# Patient Record
Sex: MALE | Race: WHITE | Employment: OTHER | URBAN - NONMETROPOLITAN AREA
[De-identification: names, ages, dates, MRNs, and addresses within clinical notes are randomized per-mention and may not be internally consistent; named-entity substitution may affect disease eponyms.]

---

## 2023-02-19 ENCOUNTER — APPOINTMENT (OUTPATIENT)
Dept: GENERAL RADIOLOGY | Age: 73
DRG: 552 | End: 2023-02-19
Payer: OTHER GOVERNMENT

## 2023-02-19 ENCOUNTER — HOSPITAL ENCOUNTER (INPATIENT)
Age: 73
LOS: 2 days | Discharge: HOME | DRG: 552 | End: 2023-02-23
Attending: EMERGENCY MEDICINE | Admitting: STUDENT IN AN ORGANIZED HEALTH CARE EDUCATION/TRAINING PROGRAM
Payer: OTHER GOVERNMENT

## 2023-02-19 DIAGNOSIS — M54.9 INTRACTABLE BACK PAIN: Primary | ICD-10-CM

## 2023-02-19 LAB
ALBUMIN SERPL-MCNC: 4 G/DL (ref 3.5–5.2)
ALP BLD-CCNC: 50 U/L (ref 40–130)
ALT SERPL-CCNC: 23 U/L (ref 5–41)
ANION GAP SERPL CALCULATED.3IONS-SCNC: 15 MMOL/L (ref 7–19)
AST SERPL-CCNC: 17 U/L (ref 5–40)
BASOPHILS ABSOLUTE: 0 K/UL (ref 0–0.2)
BASOPHILS RELATIVE PERCENT: 0.5 % (ref 0–1)
BILIRUB SERPL-MCNC: 0.8 MG/DL (ref 0.2–1.2)
BUN BLDV-MCNC: 40 MG/DL (ref 8–23)
CALCIUM SERPL-MCNC: 9.1 MG/DL (ref 8.8–10.2)
CHLORIDE BLD-SCNC: 100 MMOL/L (ref 98–111)
CO2: 20 MMOL/L (ref 22–29)
CREAT SERPL-MCNC: 2.5 MG/DL (ref 0.5–1.2)
EOSINOPHILS ABSOLUTE: 0.1 K/UL (ref 0–0.6)
EOSINOPHILS RELATIVE PERCENT: 1.2 % (ref 0–5)
GFR SERPL CREATININE-BSD FRML MDRD: 27 ML/MIN/{1.73_M2}
GLUCOSE BLD-MCNC: 185 MG/DL (ref 74–109)
HCT VFR BLD CALC: 35.9 % (ref 42–52)
HEMOGLOBIN: 12.4 G/DL (ref 14–18)
IMMATURE GRANULOCYTES #: 0 K/UL
LYMPHOCYTES ABSOLUTE: 1.8 K/UL (ref 1.1–4.5)
LYMPHOCYTES RELATIVE PERCENT: 21.8 % (ref 20–40)
MCH RBC QN AUTO: 30.3 PG (ref 27–31)
MCHC RBC AUTO-ENTMCNC: 34.5 G/DL (ref 33–37)
MCV RBC AUTO: 87.8 FL (ref 80–94)
MONOCYTES ABSOLUTE: 0.5 K/UL (ref 0–0.9)
MONOCYTES RELATIVE PERCENT: 6.6 % (ref 0–10)
NEUTROPHILS ABSOLUTE: 5.6 K/UL (ref 1.5–7.5)
NEUTROPHILS RELATIVE PERCENT: 69.7 % (ref 50–65)
PDW BLD-RTO: 13.2 % (ref 11.5–14.5)
PLATELET # BLD: 194 K/UL (ref 130–400)
PMV BLD AUTO: 9.3 FL (ref 9.4–12.4)
POTASSIUM SERPL-SCNC: 3.8 MMOL/L (ref 3.5–5)
PRO-BNP: 95 PG/ML (ref 0–900)
RBC # BLD: 4.09 M/UL (ref 4.7–6.1)
SARS-COV-2, NAAT: NOT DETECTED
SODIUM BLD-SCNC: 135 MMOL/L (ref 136–145)
TOTAL PROTEIN: 7.2 G/DL (ref 6.6–8.7)
TROPONIN: <0.01 NG/ML (ref 0–0.03)
WBC # BLD: 8.1 K/UL (ref 4.8–10.8)

## 2023-02-19 PROCEDURE — 71045 X-RAY EXAM CHEST 1 VIEW: CPT

## 2023-02-19 PROCEDURE — 93005 ELECTROCARDIOGRAM TRACING: CPT | Performed by: EMERGENCY MEDICINE

## 2023-02-19 PROCEDURE — 83880 ASSAY OF NATRIURETIC PEPTIDE: CPT

## 2023-02-19 PROCEDURE — 85025 COMPLETE CBC W/AUTO DIFF WBC: CPT

## 2023-02-19 PROCEDURE — 2580000003 HC RX 258: Performed by: EMERGENCY MEDICINE

## 2023-02-19 PROCEDURE — 99285 EMERGENCY DEPT VISIT HI MDM: CPT

## 2023-02-19 PROCEDURE — 84484 ASSAY OF TROPONIN QUANT: CPT

## 2023-02-19 PROCEDURE — 86140 C-REACTIVE PROTEIN: CPT

## 2023-02-19 PROCEDURE — 82550 ASSAY OF CK (CPK): CPT

## 2023-02-19 PROCEDURE — 6360000002 HC RX W HCPCS: Performed by: EMERGENCY MEDICINE

## 2023-02-19 PROCEDURE — 80053 COMPREHEN METABOLIC PANEL: CPT

## 2023-02-19 PROCEDURE — 96376 TX/PRO/DX INJ SAME DRUG ADON: CPT

## 2023-02-19 PROCEDURE — 6360000002 HC RX W HCPCS

## 2023-02-19 PROCEDURE — 96375 TX/PRO/DX INJ NEW DRUG ADDON: CPT

## 2023-02-19 PROCEDURE — 36415 COLL VENOUS BLD VENIPUNCTURE: CPT

## 2023-02-19 RX ORDER — ROPINIROLE 2 MG/1
2 TABLET, FILM COATED ORAL 2 TIMES DAILY
COMMUNITY

## 2023-02-19 RX ORDER — CLOPIDOGREL BISULFATE 75 MG/1
75 TABLET ORAL DAILY
COMMUNITY

## 2023-02-19 RX ORDER — DULOXETIN HYDROCHLORIDE 20 MG/1
20 CAPSULE, DELAYED RELEASE ORAL DAILY
COMMUNITY

## 2023-02-19 RX ORDER — HYDROCODONE BITARTRATE AND ACETAMINOPHEN 7.5; 325 MG/1; MG/1
1 TABLET ORAL EVERY 8 HOURS PRN
COMMUNITY

## 2023-02-19 RX ORDER — ASPIRIN 81 MG/1
81 TABLET ORAL DAILY
COMMUNITY

## 2023-02-19 RX ORDER — CETIRIZINE HYDROCHLORIDE 10 MG/1
10 TABLET ORAL DAILY
COMMUNITY

## 2023-02-19 RX ORDER — MORPHINE SULFATE 4 MG/ML
4 INJECTION, SOLUTION INTRAMUSCULAR; INTRAVENOUS ONCE
Status: COMPLETED | OUTPATIENT
Start: 2023-02-19 | End: 2023-02-19

## 2023-02-19 RX ORDER — AMOXICILLIN 875 MG/1
875 TABLET, COATED ORAL 2 TIMES DAILY
Status: ON HOLD | COMMUNITY
End: 2023-02-23 | Stop reason: HOSPADM

## 2023-02-19 RX ORDER — AMLODIPINE BESYLATE 10 MG/1
10 TABLET ORAL DAILY
COMMUNITY

## 2023-02-19 RX ORDER — CHLORAL HYDRATE 500 MG
CAPSULE ORAL 2 TIMES DAILY
COMMUNITY

## 2023-02-19 RX ORDER — ORPHENADRINE CITRATE 30 MG/ML
60 INJECTION INTRAMUSCULAR; INTRAVENOUS ONCE
Status: COMPLETED | OUTPATIENT
Start: 2023-02-19 | End: 2023-02-20

## 2023-02-19 RX ORDER — INSULIN GLARGINE 100 [IU]/ML
35 INJECTION, SOLUTION SUBCUTANEOUS 2 TIMES DAILY
COMMUNITY

## 2023-02-19 RX ORDER — LOSARTAN POTASSIUM AND HYDROCHLOROTHIAZIDE 25; 100 MG/1; MG/1
1 TABLET ORAL DAILY
COMMUNITY

## 2023-02-19 RX ORDER — EZETIMIBE 10 MG/1
10 TABLET ORAL DAILY
COMMUNITY

## 2023-02-19 RX ORDER — VITAMIN B COMPLEX
1 CAPSULE ORAL DAILY
COMMUNITY

## 2023-02-19 RX ORDER — HYDROMORPHONE HYDROCHLORIDE 1 MG/ML
1 INJECTION, SOLUTION INTRAMUSCULAR; INTRAVENOUS; SUBCUTANEOUS ONCE
Status: COMPLETED | OUTPATIENT
Start: 2023-02-19 | End: 2023-02-20

## 2023-02-19 RX ORDER — OMEPRAZOLE 20 MG/1
20 CAPSULE, DELAYED RELEASE ORAL DAILY
COMMUNITY

## 2023-02-19 RX ORDER — GABAPENTIN 100 MG/1
100 CAPSULE ORAL 3 TIMES DAILY
COMMUNITY

## 2023-02-19 RX ORDER — MORPHINE SULFATE 4 MG/ML
INJECTION, SOLUTION INTRAMUSCULAR; INTRAVENOUS
Status: COMPLETED
Start: 2023-02-19 | End: 2023-02-19

## 2023-02-19 RX ORDER — 0.9 % SODIUM CHLORIDE 0.9 %
1000 INTRAVENOUS SOLUTION INTRAVENOUS ONCE
Status: COMPLETED | OUTPATIENT
Start: 2023-02-19 | End: 2023-02-19

## 2023-02-19 RX ORDER — DOXAZOSIN 8 MG/1
4 TABLET ORAL NIGHTLY
COMMUNITY

## 2023-02-19 RX ORDER — FINASTERIDE 5 MG/1
5 TABLET, FILM COATED ORAL DAILY
COMMUNITY

## 2023-02-19 RX ORDER — GABAPENTIN 300 MG/1
300 CAPSULE ORAL 3 TIMES DAILY
COMMUNITY

## 2023-02-19 RX ADMIN — SODIUM CHLORIDE 1000 ML: 9 INJECTION, SOLUTION INTRAVENOUS at 18:12

## 2023-02-19 RX ADMIN — MORPHINE SULFATE 4 MG: 4 INJECTION, SOLUTION INTRAMUSCULAR; INTRAVENOUS at 22:50

## 2023-02-19 RX ADMIN — MORPHINE SULFATE 4 MG: 4 INJECTION, SOLUTION INTRAMUSCULAR; INTRAVENOUS at 18:12

## 2023-02-19 ASSESSMENT — PAIN DESCRIPTION - ONSET: ONSET: ON-GOING

## 2023-02-19 ASSESSMENT — PAIN SCALES - GENERAL
PAINLEVEL_OUTOF10: 8
PAINLEVEL_OUTOF10: 8
PAINLEVEL_OUTOF10: 9
PAINLEVEL_OUTOF10: 10
PAINLEVEL_OUTOF10: 9

## 2023-02-19 ASSESSMENT — PAIN DESCRIPTION - DESCRIPTORS
DESCRIPTORS: ACHING;NAGGING
DESCRIPTORS: ACHING;SHARP;STABBING
DESCRIPTORS: STABBING
DESCRIPTORS: STABBING

## 2023-02-19 ASSESSMENT — PAIN DESCRIPTION - ORIENTATION
ORIENTATION: LEFT

## 2023-02-19 ASSESSMENT — PAIN DESCRIPTION - FREQUENCY
FREQUENCY: INTERMITTENT
FREQUENCY: INTERMITTENT

## 2023-02-19 ASSESSMENT — PAIN DESCRIPTION - PAIN TYPE: TYPE: ACUTE PAIN

## 2023-02-19 ASSESSMENT — PAIN DESCRIPTION - LOCATION
LOCATION: BACK
LOCATION: FLANK;RIB CAGE
LOCATION: FLANK
LOCATION: RIB CAGE;FLANK

## 2023-02-19 ASSESSMENT — PAIN - FUNCTIONAL ASSESSMENT
PAIN_FUNCTIONAL_ASSESSMENT: PREVENTS OR INTERFERES SOME ACTIVE ACTIVITIES AND ADLS
PAIN_FUNCTIONAL_ASSESSMENT: 0-10

## 2023-02-19 NOTE — ED NOTES
Pt changed in to gown and placed on cardiac monitor at this time.       Charles Dee RN  02/19/23 7869

## 2023-02-20 ENCOUNTER — APPOINTMENT (OUTPATIENT)
Dept: ULTRASOUND IMAGING | Age: 73
DRG: 552 | End: 2023-02-20
Payer: OTHER GOVERNMENT

## 2023-02-20 ENCOUNTER — APPOINTMENT (OUTPATIENT)
Dept: CT IMAGING | Age: 73
DRG: 552 | End: 2023-02-20
Payer: OTHER GOVERNMENT

## 2023-02-20 PROBLEM — R68.89 SUSPECTED SOFT TISSUE INFECTION: Status: ACTIVE | Noted: 2023-02-20

## 2023-02-20 LAB
ABO/RH: NORMAL
ANION GAP SERPL CALCULATED.3IONS-SCNC: 13 MMOL/L (ref 7–19)
ANTIBODY SCREEN: NORMAL
APTT: 31.3 SEC (ref 26–36.2)
BACTERIA: NEGATIVE /HPF
BASOPHILS ABSOLUTE: 0 K/UL (ref 0–0.2)
BASOPHILS RELATIVE PERCENT: 0.4 % (ref 0–1)
BILIRUBIN URINE: NEGATIVE
BLOOD, URINE: NEGATIVE
BUN BLDV-MCNC: 34 MG/DL (ref 8–23)
C-REACTIVE PROTEIN: 2.83 MG/DL (ref 0–0.5)
CALCIUM SERPL-MCNC: 8.8 MG/DL (ref 8.8–10.2)
CHLORIDE BLD-SCNC: 102 MMOL/L (ref 98–111)
CLARITY: CLEAR
CO2: 20 MMOL/L (ref 22–29)
COLOR: YELLOW
CREAT SERPL-MCNC: 2.3 MG/DL (ref 0.5–1.2)
CRYSTALS, UA: ABNORMAL /HPF
D DIMER: 0.55 UG/ML FEU (ref 0–0.48)
EKG P AXIS: 32 DEGREES
EKG P-R INTERVAL: 184 MS
EKG Q-T INTERVAL: 394 MS
EKG QRS DURATION: 98 MS
EKG QTC CALCULATION (BAZETT): 420 MS
EKG T AXIS: 41 DEGREES
EOSINOPHILS ABSOLUTE: 0.2 K/UL (ref 0–0.6)
EOSINOPHILS RELATIVE PERCENT: 1.9 % (ref 0–5)
EPITHELIAL CELLS, UA: 0 /HPF (ref 0–5)
GFR SERPL CREATININE-BSD FRML MDRD: 29 ML/MIN/{1.73_M2}
GLUCOSE BLD-MCNC: 115 MG/DL (ref 70–99)
GLUCOSE BLD-MCNC: 120 MG/DL (ref 70–99)
GLUCOSE BLD-MCNC: 162 MG/DL (ref 74–109)
GLUCOSE BLD-MCNC: 174 MG/DL (ref 70–99)
GLUCOSE BLD-MCNC: 260 MG/DL (ref 70–99)
GLUCOSE URINE: =>1000 MG/DL
HCT VFR BLD CALC: 34.5 % (ref 42–52)
HEMOGLOBIN: 11.7 G/DL (ref 14–18)
HYALINE CASTS: 0 /HPF (ref 0–8)
IMMATURE GRANULOCYTES #: 0 K/UL
INR BLD: 1.09 (ref 0.88–1.18)
KETONES, URINE: NEGATIVE MG/DL
L. PNEUMOPHILA SEROGP 1 UR AG: NORMAL
LACTIC ACID: 1 MMOL/L (ref 0.5–1.9)
LEUKOCYTE ESTERASE, URINE: NEGATIVE
LYMPHOCYTES ABSOLUTE: 2 K/UL (ref 1.1–4.5)
LYMPHOCYTES RELATIVE PERCENT: 25.6 % (ref 20–40)
MCH RBC QN AUTO: 30.1 PG (ref 27–31)
MCHC RBC AUTO-ENTMCNC: 33.9 G/DL (ref 33–37)
MCV RBC AUTO: 88.7 FL (ref 80–94)
MONOCYTES ABSOLUTE: 0.6 K/UL (ref 0–0.9)
MONOCYTES RELATIVE PERCENT: 7.8 % (ref 0–10)
NEUTROPHILS ABSOLUTE: 4.9 K/UL (ref 1.5–7.5)
NEUTROPHILS RELATIVE PERCENT: 63.9 % (ref 50–65)
NITRITE, URINE: NEGATIVE
PDW BLD-RTO: 13.4 % (ref 11.5–14.5)
PERFORMED ON: ABNORMAL
PH UA: 5 (ref 5–8)
PLATELET # BLD: 176 K/UL (ref 130–400)
PMV BLD AUTO: 9.3 FL (ref 9.4–12.4)
POTASSIUM REFLEX MAGNESIUM: 4.2 MMOL/L (ref 3.5–5)
PROTEIN UA: 30 MG/DL
PROTHROMBIN TIME: 14.1 SEC (ref 12–14.6)
RBC # BLD: 3.89 M/UL (ref 4.7–6.1)
RBC UA: 3 /HPF (ref 0–4)
SEDIMENTATION RATE, ERYTHROCYTE: 48 MM/HR (ref 0–15)
SODIUM BLD-SCNC: 135 MMOL/L (ref 136–145)
SPECIFIC GRAVITY UA: 1.02 (ref 1–1.03)
STREP PNEUMONIAE ANTIGEN, URINE: NORMAL
TOTAL CK: 73 U/L (ref 39–308)
UROBILINOGEN, URINE: 0.2 E.U./DL
WBC # BLD: 7.7 K/UL (ref 4.8–10.8)
WBC UA: 1 /HPF (ref 0–5)

## 2023-02-20 PROCEDURE — 86850 RBC ANTIBODY SCREEN: CPT

## 2023-02-20 PROCEDURE — 6370000000 HC RX 637 (ALT 250 FOR IP): Performed by: HOSPITALIST

## 2023-02-20 PROCEDURE — 6360000002 HC RX W HCPCS: Performed by: HOSPITALIST

## 2023-02-20 PROCEDURE — 85610 PROTHROMBIN TIME: CPT

## 2023-02-20 PROCEDURE — 85025 COMPLETE CBC W/AUTO DIFF WBC: CPT

## 2023-02-20 PROCEDURE — 2580000003 HC RX 258: Performed by: HOSPITALIST

## 2023-02-20 PROCEDURE — G0378 HOSPITAL OBSERVATION PER HR: HCPCS

## 2023-02-20 PROCEDURE — 96365 THER/PROPH/DIAG IV INF INIT: CPT

## 2023-02-20 PROCEDURE — 76882 US LMTD JT/FCL EVL NVASC XTR: CPT

## 2023-02-20 PROCEDURE — 36415 COLL VENOUS BLD VENIPUNCTURE: CPT

## 2023-02-20 PROCEDURE — 72131 CT LUMBAR SPINE W/O DYE: CPT

## 2023-02-20 PROCEDURE — 86901 BLOOD TYPING SEROLOGIC RH(D): CPT

## 2023-02-20 PROCEDURE — 87449 NOS EACH ORGANISM AG IA: CPT

## 2023-02-20 PROCEDURE — 99222 1ST HOSP IP/OBS MODERATE 55: CPT | Performed by: SURGERY

## 2023-02-20 PROCEDURE — 74150 CT ABDOMEN W/O CONTRAST: CPT

## 2023-02-20 PROCEDURE — 85652 RBC SED RATE AUTOMATED: CPT

## 2023-02-20 PROCEDURE — 82962 GLUCOSE BLOOD TEST: CPT

## 2023-02-20 PROCEDURE — 6360000002 HC RX W HCPCS: Performed by: EMERGENCY MEDICINE

## 2023-02-20 PROCEDURE — 96367 TX/PROPH/DG ADDL SEQ IV INF: CPT

## 2023-02-20 PROCEDURE — 76882 US LMTD JT/FCL EVL NVASC XTR: CPT | Performed by: RADIOLOGY

## 2023-02-20 PROCEDURE — 2500000003 HC RX 250 WO HCPCS: Performed by: HOSPITALIST

## 2023-02-20 PROCEDURE — 80048 BASIC METABOLIC PNL TOTAL CA: CPT

## 2023-02-20 PROCEDURE — 96375 TX/PRO/DX INJ NEW DRUG ADDON: CPT

## 2023-02-20 PROCEDURE — 81001 URINALYSIS AUTO W/SCOPE: CPT

## 2023-02-20 PROCEDURE — 93010 ELECTROCARDIOGRAM REPORT: CPT | Performed by: INTERNAL MEDICINE

## 2023-02-20 PROCEDURE — 76770 US EXAM ABDO BACK WALL COMP: CPT

## 2023-02-20 PROCEDURE — 87040 BLOOD CULTURE FOR BACTERIA: CPT

## 2023-02-20 PROCEDURE — 96376 TX/PRO/DX INJ SAME DRUG ADON: CPT

## 2023-02-20 PROCEDURE — 71250 CT THORAX DX C-: CPT

## 2023-02-20 PROCEDURE — 83605 ASSAY OF LACTIC ACID: CPT

## 2023-02-20 PROCEDURE — 94760 N-INVAS EAR/PLS OXIMETRY 1: CPT

## 2023-02-20 PROCEDURE — 85730 THROMBOPLASTIN TIME PARTIAL: CPT

## 2023-02-20 PROCEDURE — 86900 BLOOD TYPING SEROLOGIC ABO: CPT

## 2023-02-20 PROCEDURE — 87635 SARS-COV-2 COVID-19 AMP PRB: CPT

## 2023-02-20 PROCEDURE — 96372 THER/PROPH/DIAG INJ SC/IM: CPT

## 2023-02-20 PROCEDURE — 85379 FIBRIN DEGRADATION QUANT: CPT

## 2023-02-20 RX ORDER — CALCIUM CARBONATE 200(500)MG
500 TABLET,CHEWABLE ORAL 3 TIMES DAILY PRN
Status: DISCONTINUED | OUTPATIENT
Start: 2023-02-20 | End: 2023-02-23 | Stop reason: HOSPADM

## 2023-02-20 RX ORDER — CETIRIZINE HYDROCHLORIDE 10 MG/1
10 TABLET ORAL DAILY PRN
Status: DISCONTINUED | OUTPATIENT
Start: 2023-02-20 | End: 2023-02-20

## 2023-02-20 RX ORDER — ROPINIROLE 2 MG/1
2 TABLET, FILM COATED ORAL 2 TIMES DAILY
Status: DISCONTINUED | OUTPATIENT
Start: 2023-02-20 | End: 2023-02-23 | Stop reason: HOSPADM

## 2023-02-20 RX ORDER — ACETAMINOPHEN 650 MG/1
650 SUPPOSITORY RECTAL EVERY 6 HOURS PRN
Status: DISCONTINUED | OUTPATIENT
Start: 2023-02-20 | End: 2023-02-23 | Stop reason: HOSPADM

## 2023-02-20 RX ORDER — MEPERIDINE HYDROCHLORIDE 25 MG/ML
25 INJECTION INTRAMUSCULAR; INTRAVENOUS; SUBCUTANEOUS EVERY 4 HOURS PRN
Status: DISPENSED | OUTPATIENT
Start: 2023-02-20 | End: 2023-02-22

## 2023-02-20 RX ORDER — DEXTROSE MONOHYDRATE 100 MG/ML
INJECTION, SOLUTION INTRAVENOUS CONTINUOUS PRN
Status: DISCONTINUED | OUTPATIENT
Start: 2023-02-20 | End: 2023-02-23 | Stop reason: HOSPADM

## 2023-02-20 RX ORDER — HYDROMORPHONE HYDROCHLORIDE 1 MG/ML
1 INJECTION, SOLUTION INTRAMUSCULAR; INTRAVENOUS; SUBCUTANEOUS ONCE
Status: COMPLETED | OUTPATIENT
Start: 2023-02-20 | End: 2023-02-20

## 2023-02-20 RX ORDER — INSULIN GLARGINE 100 [IU]/ML
35 INJECTION, SOLUTION SUBCUTANEOUS 2 TIMES DAILY
Status: DISCONTINUED | OUTPATIENT
Start: 2023-02-20 | End: 2023-02-23 | Stop reason: HOSPADM

## 2023-02-20 RX ORDER — LOSARTAN POTASSIUM 100 MG/1
100 TABLET ORAL DAILY
Status: DISCONTINUED | OUTPATIENT
Start: 2023-02-20 | End: 2023-02-23 | Stop reason: HOSPADM

## 2023-02-20 RX ORDER — MECOBALAMIN 5000 MCG
5 TABLET,DISINTEGRATING ORAL NIGHTLY PRN
Status: DISCONTINUED | OUTPATIENT
Start: 2023-02-20 | End: 2023-02-23 | Stop reason: HOSPADM

## 2023-02-20 RX ORDER — CLOPIDOGREL BISULFATE 75 MG/1
75 TABLET ORAL DAILY
Status: DISCONTINUED | OUTPATIENT
Start: 2023-02-20 | End: 2023-02-23 | Stop reason: HOSPADM

## 2023-02-20 RX ORDER — ENOXAPARIN SODIUM 100 MG/ML
30 INJECTION SUBCUTANEOUS 2 TIMES DAILY
Status: DISCONTINUED | OUTPATIENT
Start: 2023-02-20 | End: 2023-02-23 | Stop reason: HOSPADM

## 2023-02-20 RX ORDER — HYDROMORPHONE HYDROCHLORIDE 1 MG/ML
2 INJECTION, SOLUTION INTRAMUSCULAR; INTRAVENOUS; SUBCUTANEOUS EVERY 4 HOURS PRN
Status: DISCONTINUED | OUTPATIENT
Start: 2023-02-20 | End: 2023-02-20

## 2023-02-20 RX ORDER — POTASSIUM CHLORIDE 7.45 MG/ML
10 INJECTION INTRAVENOUS PRN
Status: DISCONTINUED | OUTPATIENT
Start: 2023-02-20 | End: 2023-02-23 | Stop reason: HOSPADM

## 2023-02-20 RX ORDER — SODIUM CHLORIDE 0.9 % (FLUSH) 0.9 %
5-40 SYRINGE (ML) INJECTION PRN
Status: DISCONTINUED | OUTPATIENT
Start: 2023-02-20 | End: 2023-02-23 | Stop reason: HOSPADM

## 2023-02-20 RX ORDER — ONDANSETRON 2 MG/ML
4 INJECTION INTRAMUSCULAR; INTRAVENOUS EVERY 6 HOURS PRN
Status: DISCONTINUED | OUTPATIENT
Start: 2023-02-20 | End: 2023-02-23 | Stop reason: HOSPADM

## 2023-02-20 RX ORDER — ORPHENADRINE CITRATE 30 MG/ML
60 INJECTION INTRAMUSCULAR; INTRAVENOUS ONCE
Status: COMPLETED | OUTPATIENT
Start: 2023-02-20 | End: 2023-02-20

## 2023-02-20 RX ORDER — AMLODIPINE BESYLATE 5 MG/1
10 TABLET ORAL DAILY
Status: DISCONTINUED | OUTPATIENT
Start: 2023-02-20 | End: 2023-02-23 | Stop reason: HOSPADM

## 2023-02-20 RX ORDER — MAGNESIUM SULFATE IN WATER 40 MG/ML
2000 INJECTION, SOLUTION INTRAVENOUS PRN
Status: DISCONTINUED | OUTPATIENT
Start: 2023-02-20 | End: 2023-02-23 | Stop reason: HOSPADM

## 2023-02-20 RX ORDER — ORPHENADRINE CITRATE 30 MG/ML
60 INJECTION INTRAMUSCULAR; INTRAVENOUS EVERY 12 HOURS
Status: DISCONTINUED | OUTPATIENT
Start: 2023-02-20 | End: 2023-02-22

## 2023-02-20 RX ORDER — ROSUVASTATIN CALCIUM 10 MG/1
10 TABLET, COATED ORAL DAILY
Status: DISCONTINUED | OUTPATIENT
Start: 2023-02-20 | End: 2023-02-23 | Stop reason: HOSPADM

## 2023-02-20 RX ORDER — GABAPENTIN 300 MG/1
300 CAPSULE ORAL 3 TIMES DAILY
Status: DISCONTINUED | OUTPATIENT
Start: 2023-02-20 | End: 2023-02-22

## 2023-02-20 RX ORDER — SODIUM CHLORIDE 0.9 % (FLUSH) 0.9 %
5-40 SYRINGE (ML) INJECTION EVERY 12 HOURS SCHEDULED
Status: DISCONTINUED | OUTPATIENT
Start: 2023-02-20 | End: 2023-02-23 | Stop reason: HOSPADM

## 2023-02-20 RX ORDER — POLYETHYLENE GLYCOL 3350 17 G/17G
17 POWDER, FOR SOLUTION ORAL DAILY PRN
Status: DISCONTINUED | OUTPATIENT
Start: 2023-02-20 | End: 2023-02-23 | Stop reason: HOSPADM

## 2023-02-20 RX ORDER — ACETAMINOPHEN 325 MG/1
650 TABLET ORAL EVERY 4 HOURS PRN
Status: DISCONTINUED | OUTPATIENT
Start: 2023-02-20 | End: 2023-02-23 | Stop reason: HOSPADM

## 2023-02-20 RX ORDER — POTASSIUM CHLORIDE 20 MEQ/1
40 TABLET, EXTENDED RELEASE ORAL PRN
Status: DISCONTINUED | OUTPATIENT
Start: 2023-02-20 | End: 2023-02-23 | Stop reason: HOSPADM

## 2023-02-20 RX ORDER — LOSARTAN POTASSIUM AND HYDROCHLOROTHIAZIDE 25; 100 MG/1; MG/1
1 TABLET ORAL DAILY
Status: DISCONTINUED | OUTPATIENT
Start: 2023-02-20 | End: 2023-02-20 | Stop reason: CLARIF

## 2023-02-20 RX ORDER — DOXAZOSIN 2 MG/1
4 TABLET ORAL DAILY
Status: DISCONTINUED | OUTPATIENT
Start: 2023-02-20 | End: 2023-02-23 | Stop reason: HOSPADM

## 2023-02-20 RX ORDER — HYDROMORPHONE HYDROCHLORIDE 1 MG/ML
1 INJECTION, SOLUTION INTRAMUSCULAR; INTRAVENOUS; SUBCUTANEOUS EVERY 4 HOURS PRN
Status: DISCONTINUED | OUTPATIENT
Start: 2023-02-20 | End: 2023-02-20

## 2023-02-20 RX ORDER — TIZANIDINE 4 MG/1
4 TABLET ORAL EVERY 6 HOURS PRN
Status: DISCONTINUED | OUTPATIENT
Start: 2023-02-20 | End: 2023-02-22

## 2023-02-20 RX ORDER — ONDANSETRON 4 MG/1
4 TABLET, ORALLY DISINTEGRATING ORAL EVERY 8 HOURS PRN
Status: DISCONTINUED | OUTPATIENT
Start: 2023-02-20 | End: 2023-02-23 | Stop reason: HOSPADM

## 2023-02-20 RX ORDER — NALOXONE HYDROCHLORIDE 0.4 MG/ML
0.4 INJECTION, SOLUTION INTRAMUSCULAR; INTRAVENOUS; SUBCUTANEOUS PRN
Status: DISCONTINUED | OUTPATIENT
Start: 2023-02-20 | End: 2023-02-23 | Stop reason: HOSPADM

## 2023-02-20 RX ORDER — HYDROMORPHONE HYDROCHLORIDE 1 MG/ML
0.5 INJECTION, SOLUTION INTRAMUSCULAR; INTRAVENOUS; SUBCUTANEOUS EVERY 4 HOURS PRN
Status: DISCONTINUED | OUTPATIENT
Start: 2023-02-20 | End: 2023-02-20

## 2023-02-20 RX ORDER — EZETIMIBE 10 MG/1
10 TABLET ORAL DAILY
Status: DISCONTINUED | OUTPATIENT
Start: 2023-02-20 | End: 2023-02-23 | Stop reason: HOSPADM

## 2023-02-20 RX ORDER — VITAMIN B COMPLEX
2000 TABLET ORAL DAILY
Status: DISCONTINUED | OUTPATIENT
Start: 2023-02-20 | End: 2023-02-23 | Stop reason: HOSPADM

## 2023-02-20 RX ORDER — CHLORAL HYDRATE 500 MG
1 CAPSULE ORAL 2 TIMES DAILY
Status: DISCONTINUED | OUTPATIENT
Start: 2023-02-20 | End: 2023-02-20 | Stop reason: CLARIF

## 2023-02-20 RX ORDER — VITAMIN C
1 TAB ORAL DAILY
Status: DISCONTINUED | OUTPATIENT
Start: 2023-02-20 | End: 2023-02-23 | Stop reason: HOSPADM

## 2023-02-20 RX ORDER — FINASTERIDE 5 MG/1
5 TABLET, FILM COATED ORAL DAILY
Status: DISCONTINUED | OUTPATIENT
Start: 2023-02-20 | End: 2023-02-23 | Stop reason: HOSPADM

## 2023-02-20 RX ORDER — INSULIN LISPRO 100 [IU]/ML
0-4 INJECTION, SOLUTION INTRAVENOUS; SUBCUTANEOUS NIGHTLY
Status: DISCONTINUED | OUTPATIENT
Start: 2023-02-20 | End: 2023-02-23 | Stop reason: HOSPADM

## 2023-02-20 RX ORDER — CLINDAMYCIN PHOSPHATE 900 MG/50ML
900 INJECTION INTRAVENOUS EVERY 8 HOURS
Status: DISCONTINUED | OUTPATIENT
Start: 2023-02-20 | End: 2023-02-20

## 2023-02-20 RX ORDER — DULOXETIN HYDROCHLORIDE 20 MG/1
20 CAPSULE, DELAYED RELEASE ORAL DAILY
Status: DISCONTINUED | OUTPATIENT
Start: 2023-02-20 | End: 2023-02-23 | Stop reason: HOSPADM

## 2023-02-20 RX ORDER — HYDROMORPHONE HYDROCHLORIDE 1 MG/ML
2 INJECTION, SOLUTION INTRAMUSCULAR; INTRAVENOUS; SUBCUTANEOUS EVERY 4 HOURS PRN
Status: DISCONTINUED | OUTPATIENT
Start: 2023-02-20 | End: 2023-02-21

## 2023-02-20 RX ORDER — INSULIN LISPRO 100 [IU]/ML
0-8 INJECTION, SOLUTION INTRAVENOUS; SUBCUTANEOUS
Status: DISCONTINUED | OUTPATIENT
Start: 2023-02-20 | End: 2023-02-23 | Stop reason: HOSPADM

## 2023-02-20 RX ORDER — ASPIRIN 81 MG/1
81 TABLET ORAL DAILY
Status: DISCONTINUED | OUTPATIENT
Start: 2023-02-20 | End: 2023-02-23 | Stop reason: HOSPADM

## 2023-02-20 RX ORDER — SODIUM CHLORIDE 9 MG/ML
INJECTION, SOLUTION INTRAVENOUS PRN
Status: DISCONTINUED | OUTPATIENT
Start: 2023-02-20 | End: 2023-02-23 | Stop reason: HOSPADM

## 2023-02-20 RX ORDER — PANTOPRAZOLE SODIUM 20 MG/1
20 TABLET, DELAYED RELEASE ORAL
Status: DISCONTINUED | OUTPATIENT
Start: 2023-02-20 | End: 2023-02-23 | Stop reason: HOSPADM

## 2023-02-20 RX ORDER — HYDROCHLOROTHIAZIDE 25 MG/1
25 TABLET ORAL DAILY
Status: DISCONTINUED | OUTPATIENT
Start: 2023-02-20 | End: 2023-02-23 | Stop reason: HOSPADM

## 2023-02-20 RX ADMIN — CLINDAMYCIN PHOSPHATE 900 MG: 900 INJECTION, SOLUTION INTRAVENOUS at 04:22

## 2023-02-20 RX ADMIN — LOSARTAN POTASSIUM 100 MG: 100 TABLET, FILM COATED ORAL at 11:06

## 2023-02-20 RX ADMIN — FINASTERIDE 5 MG: 5 TABLET, FILM COATED ORAL at 11:06

## 2023-02-20 RX ADMIN — ROPINIROLE HYDROCHLORIDE 2 MG: 2 TABLET, FILM COATED ORAL at 11:06

## 2023-02-20 RX ADMIN — ROPINIROLE HYDROCHLORIDE 2 MG: 2 TABLET, FILM COATED ORAL at 21:13

## 2023-02-20 RX ADMIN — INSULIN GLARGINE 35 UNITS: 100 INJECTION, SOLUTION SUBCUTANEOUS at 21:15

## 2023-02-20 RX ADMIN — SODIUM CHLORIDE, PRESERVATIVE FREE 10 ML: 5 INJECTION INTRAVENOUS at 16:51

## 2023-02-20 RX ADMIN — HYDROCHLOROTHIAZIDE 25 MG: 25 TABLET ORAL at 11:06

## 2023-02-20 RX ADMIN — VANCOMYCIN HYDROCHLORIDE 2500 MG: 10 INJECTION, POWDER, LYOPHILIZED, FOR SOLUTION INTRAVENOUS at 06:15

## 2023-02-20 RX ADMIN — Medication 2000 UNITS: at 11:06

## 2023-02-20 RX ADMIN — GABAPENTIN 300 MG: 300 CAPSULE ORAL at 14:39

## 2023-02-20 RX ADMIN — MEROPENEM 1000 MG: 1 INJECTION, POWDER, FOR SOLUTION INTRAVENOUS at 06:00

## 2023-02-20 RX ADMIN — ORPHENADRINE CITRATE 60 MG: 30 INJECTION INTRAMUSCULAR; INTRAVENOUS at 08:06

## 2023-02-20 RX ADMIN — Medication 1 TABLET: at 11:06

## 2023-02-20 RX ADMIN — ORPHENADRINE CITRATE 60 MG: 30 INJECTION INTRAMUSCULAR; INTRAVENOUS at 00:21

## 2023-02-20 RX ADMIN — ORPHENADRINE CITRATE 60 MG: 30 INJECTION INTRAMUSCULAR; INTRAVENOUS at 17:15

## 2023-02-20 RX ADMIN — MEROPENEM 1000 MG: 1 INJECTION, POWDER, FOR SOLUTION INTRAVENOUS at 17:15

## 2023-02-20 RX ADMIN — HYDROMORPHONE HYDROCHLORIDE 1 MG: 1 INJECTION, SOLUTION INTRAMUSCULAR; INTRAVENOUS; SUBCUTANEOUS at 03:34

## 2023-02-20 RX ADMIN — ENOXAPARIN SODIUM 30 MG: 100 INJECTION SUBCUTANEOUS at 21:13

## 2023-02-20 RX ADMIN — AMLODIPINE BESYLATE 10 MG: 5 TABLET ORAL at 11:06

## 2023-02-20 RX ADMIN — PANTOPRAZOLE SODIUM 20 MG: 20 TABLET, DELAYED RELEASE ORAL at 11:06

## 2023-02-20 RX ADMIN — SODIUM CHLORIDE, PRESERVATIVE FREE 10 ML: 5 INJECTION INTRAVENOUS at 11:07

## 2023-02-20 RX ADMIN — HYDROMORPHONE HYDROCHLORIDE 1 MG: 1 INJECTION, SOLUTION INTRAMUSCULAR; INTRAVENOUS; SUBCUTANEOUS at 00:04

## 2023-02-20 RX ADMIN — DULOXETINE 20 MG: 20 CAPSULE, DELAYED RELEASE ORAL at 11:06

## 2023-02-20 RX ADMIN — MEPERIDINE HYDROCHLORIDE 25 MG: 25 INJECTION, SOLUTION INTRAMUSCULAR; INTRAVENOUS; SUBCUTANEOUS at 11:15

## 2023-02-20 RX ADMIN — HYDROMORPHONE HYDROCHLORIDE 0.5 MG: 1 INJECTION, SOLUTION INTRAMUSCULAR; INTRAVENOUS; SUBCUTANEOUS at 08:07

## 2023-02-20 RX ADMIN — GABAPENTIN 300 MG: 300 CAPSULE ORAL at 08:07

## 2023-02-20 RX ADMIN — HYDROMORPHONE HYDROCHLORIDE 2 MG: 1 INJECTION, SOLUTION INTRAMUSCULAR; INTRAVENOUS; SUBCUTANEOUS at 16:46

## 2023-02-20 RX ADMIN — DOXAZOSIN 4 MG: 2 TABLET ORAL at 11:06

## 2023-02-20 RX ADMIN — GABAPENTIN 300 MG: 300 CAPSULE ORAL at 21:13

## 2023-02-20 RX ADMIN — SODIUM CHLORIDE, PRESERVATIVE FREE 10 ML: 5 INJECTION INTRAVENOUS at 21:14

## 2023-02-20 RX ADMIN — ROSUVASTATIN CALCIUM 10 MG: 10 TABLET, FILM COATED ORAL at 11:06

## 2023-02-20 ASSESSMENT — PAIN SCALES - GENERAL
PAINLEVEL_OUTOF10: 0
PAINLEVEL_OUTOF10: 10
PAINLEVEL_OUTOF10: 8
PAINLEVEL_OUTOF10: 10
PAINLEVEL_OUTOF10: 10
PAINLEVEL_OUTOF10: 8
PAINLEVEL_OUTOF10: 0

## 2023-02-20 ASSESSMENT — ENCOUNTER SYMPTOMS
DIARRHEA: 0
NAUSEA: 0
ABDOMINAL PAIN: 0
SHORTNESS OF BREATH: 0
CONSTIPATION: 0
VOMITING: 0
ABDOMINAL DISTENTION: 0
EYE PAIN: 0
EYE REDNESS: 0
SORE THROAT: 0
COUGH: 0
CHEST TIGHTNESS: 0
COLOR CHANGE: 0
BACK PAIN: 1

## 2023-02-20 ASSESSMENT — PAIN - FUNCTIONAL ASSESSMENT
PAIN_FUNCTIONAL_ASSESSMENT: PREVENTS OR INTERFERES WITH MANY ACTIVE NOT PASSIVE ACTIVITIES
PAIN_FUNCTIONAL_ASSESSMENT: PREVENTS OR INTERFERES SOME ACTIVE ACTIVITIES AND ADLS

## 2023-02-20 ASSESSMENT — PAIN DESCRIPTION - LOCATION
LOCATION: FLANK
LOCATION: ABDOMEN;BACK
LOCATION: BACK
LOCATION: BACK

## 2023-02-20 ASSESSMENT — PAIN DESCRIPTION - DESCRIPTORS
DESCRIPTORS: ACHING;CRUSHING;SHOOTING
DESCRIPTORS: DISCOMFORT
DESCRIPTORS: STABBING

## 2023-02-20 ASSESSMENT — PAIN DESCRIPTION - ORIENTATION
ORIENTATION: LEFT
ORIENTATION: LEFT;MID
ORIENTATION: MID

## 2023-02-20 NOTE — PROGRESS NOTES
Pharmacy Renal Adjustment    Graciela Molina Sr. is a 71 y.o. male. Pharmacy has renally adjusted medications per protocol. Recent Labs     02/19/23  1328 02/20/23  0440   BUN 40* 34*       Recent Labs     02/19/23  1328 02/20/23  0440   CREATININE 2.5* 2.3*       Estimated Creatinine Clearance: 39 mL/min (A) (based on SCr of 2.3 mg/dL (H)).     Height:   Ht Readings from Last 1 Encounters:   02/20/23 6' 2\" (1.88 m)     Weight:  Wt Readings from Last 1 Encounters:   02/20/23 243 lb (110.2 kg)           Baseline SCr: 2.2    Plan: Adjust the following medications based on renal function:           Change Merrem to 1000 mg IV over 180 minute every 12 hours x 7 days for CRCL 30-59 for SSTI    Electronically signed by Liliane Lopez [facility] on 2/20/2023 at 11:37 AM

## 2023-02-20 NOTE — ED NOTES
Food tray given per request and approval of .  Patient updated at this time of pending admission     Ghazal Walker RN  02/19/23 5323

## 2023-02-20 NOTE — CONSULTS
[facility]    Patient ID: Suzette Glover Sr.  71 y.o.  male  YOB: 1950    Admitting Diagnosis: Intractable back pain [M54.9]  Suspected soft tissue infection [R68.89]    Chief Complaint:  Chief Complaint   Patient presents with    Back Pain     Turned over in bed and had a sharp pain in left lower back     Shortness of Breath       Subjective:    Mr. Conchis Smith is a 71 y.o. male who presented overnight with complaints of back pain. The pain is worse on his left than his right. He states this started Friday night into Saturday, when rolling over in bed he felt a sharp pain in the left flank. The pain is worse with movement. The pain was most severe after sitting on the toilet, as he had extreme difficulty in standing from this position. It is worse with movement and it is tender to touch. He denies having this pain this severe int he past. He has had spinal surgery in the past. He denies dysuria or change in any urinary symptoms. He has not lost control of his bladder or bowels. He states his right leg it starting to fell like it is falling asleep. Denies fever or chills.      Past Medical History:   Diagnosis Date    CAD (coronary artery disease)     Class 1 obesity     Colon polyps     Diabetes mellitus ([address]     Hyperlipidemia     Hypertension     Kidney stone     Psoriatic arthritis ([address]     Stage 3b chronic kidney disease (CKD) ([address] GFR 30 for YAHIR at [facility] on [id number]      Past Surgical History:   Procedure Laterality Date    [address] IMPLANT Bilateral 2000    DENTAL SURGERY  01/2023    lowe right wisdomn tooth extraction    FRACTURE SURGERY Right 2000    elbow    TUMOR REMOVAL  1995    fatty tumor, at base of skull     Current Facility-Administered Medications   Medication Dose Route Frequency Provider Last Rate Last Admin    meropenem (MERREM) 1,000 mg in sodium chloride 0.9 % 100 mL IVPB (Omia9Die)  1,000 mg IntraVENous Q8H Leanne Albrecht MD        amLODIPine (NORVASC) tablet 10 mg  10 mg Oral Daily Leanne Albrecht MD   10 mg at 02/20/23 1106    aspirin EC tablet 81 mg  81 mg Oral Daily Leanne Albrecht MD        vitamin B and C (TOTAL B-C) 1 tablet  1 tablet Oral Daily Leanne Albrecht MD   1 tablet at 02/20/23 1106    cetirizine (ZYRTEC) tablet 10 mg  10 mg Oral Daily PRN Leanne Albrecht MD        clopidogrel (PLAVIX) tablet 75 mg  75 mg Oral Daily Leanne Albrecht MD        doxazosin (CARDURA) tablet 4 mg  4 mg Oral Daily Leanne Albrecht MD   4 mg at 02/20/23 1106    DULoxetine (CYMBALTA) extended release capsule 20 mg  20 mg Oral Daily Leanne Albrecht MD   20 mg at 02/20/23 1106    empagliflozin (JARDIANCE) tablet 25 mg  25 mg Oral Daily Leanne Albrecht MD        ezetimibe (ZETIA) tablet 10 mg  10 mg Oral Daily Leanne Albrecht MD        finasteride (PROSCAR) tablet 5 mg  5 mg Oral Daily Leanne Albrecht MD   5 mg at 02/20/23 1106    gabapentin (NEURONTIN) capsule 300 mg  300 mg Oral TID Leanne Albrecht MD   300 mg at 02/20/23 4302    insulin glargine (LANTUS) injection vial 35 Units  35 Units SubCUTAneous BID Leanne Albrecht MD        Vitamin D (CHOLECALCIFEROL) tablet 2,000 Units  2,000 Units Oral Daily Leanne Albrecht MD   2,000 Units at 02/20/23 1106    rOPINIRole (REQUIP) tablet 2 mg  2 mg Oral BID Leanne Albrecht MD   2 mg at 02/20/23 1106    rosuvastatin (CRESTOR) tablet 10 mg  10 mg Oral Daily Leanne Albrecht MD   10 mg at 02/20/23 1106    pantoprazole (PROTONIX) tablet 20 mg  20 mg Oral QAM AC Leanne Albrecht MD   20 mg at 02/20/23 1106    vancomycin (VANCOCIN) intermittent dosing (placeholder)   Other RX Placeholder Leanne Albrecht MD        sodium chloride flush 0.9 % injection 5-40 mL  5-40 mL IntraVENous 2 times per day Leanne Albrecht MD   10 mL at 02/20/23 1107    sodium chloride flush 0.9 % injection 5-40 mL  5-40 mL IntraVENous PRN Leanne Albrecht MD        0.9 % sodium chloride infusion   IntraVENous PRN Leanne Albrecht, MD        enoxaparin Sodium (LOVENOX) injection 30 mg  30 mg SubCUTAneous BID Nalini Gan, MD        ondansetron (ZOFRAN-ODT) disintegrating tablet 4 mg  4 mg Oral Q8H PRN Nalini Spray, MD        Or    ondansetron [facility]) injection 4 mg  4 mg IntraVENous Q6H PRN Nalini Gan, MD        acetaminophen (TYLENOL) tablet 650 mg  650 mg Oral Q4H PRN Nalini Spray, MD        Or    acetaminophen (TYLENOL) suppository 650 mg  650 mg Rectal Q6H PRN Nalini Spray, MD        potassium chloride (KLOR-CON M) extended release tablet 40 mEq  40 mEq Oral PRN Nalini Spray, MD        Or    potassium bicarb-citric acid (EFFER-K) effervescent tablet 40 mEq  40 mEq Oral PRN Nalini Spray, MD        Or    potassium chloride 10 mEq/100 mL IVPB (Peripheral Line)  10 mEq IntraVENous PRN Nalini Spray, MD        magnesium sulfate 2000 mg in 50 mL IVPB premix  2,000 mg IntraVENous PRN Nalini Spray, MD        sodium phosphate 36.3 mmol in sodium chloride 0.9 % 250 mL IVPB  0.32 mmol/kg IntraVENous PRN Nalini Spray, MD        polyethylene glycol (GLYCOLAX) packet 17 g  17 g Oral Daily PRN Nalini Gan, MD        melatonin disintegrating tablet 5 mg  5 mg Oral Nightly PRN Nalini Spray, MD        calcium carbonate (TUMS) chewable tablet 500 mg  500 mg Oral TID PRN Nalini Gan MD        insulin lispro (HUMALOG) injection vial 0-8 Units  0-8 Units SubCUTAneous TID WC Nalini Gan MD        insulin lispro (HUMALOG) injection vial 0-4 Units  0-4 Units SubCUTAneous Nightly Nalini Spray, MD        glucose chewable tablet 16 g  4 tablet Oral PRN Nalini Gan, MD        dextrose bolus 10% 125 mL  125 mL IntraVENous PRN Nalini Gan MD        Or    dextrose bolus 10% 250 mL  250 mL IntraVENous PRN Nalini Gan MD        glucagon (rDNA) injection 1 mg  1 mg SubCUTAneous PRN Nalini Gan MD        dextrose 10 % infusion   IntraVENous Continuous PRN Nalini Gan MD        naloxone [facility]) injection 0.4 mg  0.4 mg IntraVENous PRN Umm Davis MD        losartan (COZAAR) tablet 100 mg  100 mg Oral Daily Umm Davis MD   100 mg at 23 1106    And    hydroCHLOROthiazide (HYDRODIURIL) tablet 25 mg  25 mg Oral Daily Umm Davis MD   25 mg at 23 1106    HYDROmorphone HCl PF (DILAUDID) injection 2 mg  2 mg IntraVENous Q4H PRN Regino Samuel MD        meperidine (DEMEROL) injection 25 mg  25 mg IntraVENous Q4H PRN Regino Samuel MD         Allergies: Patient has no known allergies. Family History   Problem Relation Age of Onset    Heart Disease Mother         did at age 80    Heart Surgery Mother         has CABG 2 times    Heart Attack Father          og this atage 80    Heart Disease Father     Heart Surgery Sister     Heart Disease Sister     Other Brother         COVID-19    Diabetes Brother         bilateral BKA    Other Son         born with half a liver    Other Son         has mild cystic fibrosis       Social History     Tobacco Use    Smoking status: Former     Packs/day: 1.00     Years: 24.00     Pack years: 24.00     Types: Cigarettes     Start date:      Quit date:      Years since quittin.1    Smokeless tobacco: Never   Substance Use Topics    Alcohol use: Not Currently     Comment: in past, can not take with his medications, no drinking in 20 years       Review of Systems   Constitutional:  Negative for fatigue, fever and unexpected weight change. HENT:  Negative for hearing loss, nosebleeds and sore throat. Eyes:  Negative for pain, redness and visual disturbance. Respiratory:  Negative for cough, chest tightness and shortness of breath. Cardiovascular:  Negative for chest pain, palpitations and leg swelling. Gastrointestinal:  Negative for abdominal distention, abdominal pain, constipation, diarrhea, nausea and vomiting. Endocrine: Negative for cold intolerance, heat intolerance and polydipsia.    Genitourinary:  Negative for difficulty urinating, frequency and urgency. Musculoskeletal:  Positive for arthralgias, back pain, gait problem and myalgias. Negative for joint swelling and neck pain. Skin:  Negative for color change, rash and wound. Allergic/Immunologic: Negative for environmental allergies and food allergies. Neurological:  Positive for weakness. Negative for seizures, light-headedness and headaches. Hematological:  Negative for adenopathy. Does not bruise/bleed easily. Psychiatric/Behavioral:  Negative for confusion, sleep disturbance and suicidal ideas. Objective:   /66   Pulse 62   Temp 98.5 °F (36.9 °C)   Resp 16   Ht 6' 2\" (1.88 m)   Wt 243 lb (110.2 kg)   SpO2 95%   BMI 31.20 kg/m²   No intake or output data in the 24 hours ending 02/20/23 1109  Physical Exam  Vitals reviewed. Constitutional:       Appearance: He is well-developed. HENT:      Head: Normocephalic and atraumatic. Eyes:      Pupils: Pupils are equal, round, and reactive to light. Cardiovascular:      Rate and Rhythm: Normal rate and regular rhythm. Heart sounds: Normal heart sounds. Pulmonary:      Effort: Pulmonary effort is normal.      Breath sounds: Normal breath sounds. No wheezing or rales. Abdominal:      General: There is no distension. Palpations: Abdomen is soft. There is no mass. Tenderness: There is no abdominal tenderness. There is no guarding or rebound. Musculoskeletal:         General: Normal range of motion. Cervical back: Normal range of motion and neck supple. Comments: Weakness to LLE. Pain b/l le with straight leg raise and with resistance. B/L NVI   Lymphadenopathy:      Cervical: No cervical adenopathy. Skin:     General: Skin is warm and dry. Comments: No erythema or inflammatory changes appreciates. No fluctuance. No drainage. Psoriatic changes to the left hip (chronic). No overlying signs of infection along his psoriasis plaques.    Neurological:      Mental Status: He is alert and oriented to person, place, and time. Psychiatric:         Behavior: Behavior normal.         Thought Content: Thought content normal.         Judgment: Judgment normal.       Data:  CBC:   Recent Labs     02/19/23  1328 02/20/23  0440   WBC 8.1 7.7   RBC 4.09* 3.89*   HGB 12.4* 11.7*   HCT 35.9* 34.5*   MCV 87.8 88.7   RDW 13.2 13.4    176     BMP:   Recent Labs     02/19/23  1328 02/20/23  0440   * 135*   K 3.8 4.2    102   CO2 20* 20*   ANIONGAP 15 13   GLUCOSE 185* 162*   CREATININE 2.5* 2.3*   LABGLOM 27* 29*   CALCIUM 9.1 8.8     LFT:   Recent Labs     02/19/23  1328   PROT 7.2   LABALBU 4.0   BILITOT 0.8   ALKPHOS 50   ALT 23   AST 17     PT/INR:  Recent Labs     02/20/23  0440   PROTIME 14.1   INR 1.09     CT Chest W/O Contrast 2/20/2023  Impression:     There are bibasilar areas of atelectasis and/or scarring. There is evidence for old granulomatous disease. There are old healed left rib fractures. There is a compression fracture of the T3 vertebral body of undetermined age. Electronically   signed by Patti Carvalho MD on 02-20-23 at 9474   CT lumbar spine   Impression:     No acute fracture. Electronically signed by Osei Enamorado MD on 02-20-23 at [phone]   CT kidney   Impression:     There is perinephric stranding. No evidence of renal calculi or hydronephrosis. Electronically signed by Patti Carvalho MD on 02-20-23 at 6844       Assessment:     Principal Problem:    Suspected soft tissue infection  Resolved Problems:    * No resolved hospital problems. *      Plan:     No signs of soft tissue infection appreciate. No signs of soft tissue inflammatory change on the ct scan. No air or inflammatory changes in the subcutaneous space to even suggest soft tissue infection. No leukocytosis to support infection. No erythema. No signs of abscess on physical exam.     Recommend MRI of the lumbar spine and pending those results consider Neurosurgery evaluation. No further surgical intervention required. Please reconsult as needed.        Electronically signed by Nolene Najjar, DO on 9/39/3851 at 11:09 AM

## 2023-02-20 NOTE — ED NOTES
Patient states that his left flank and posterior rib cage pain is intermittent and episodic in nature and hurts only with movement as if he could have pulled a muscle. He states that he used a lidoderm patch that he had at home last night and that did not help the pain.      Kyle Zaragoza RN  02/19/23 2100

## 2023-02-20 NOTE — H&P
[facility] History and Physical    Patient Information:  Patient: Pretty Recio Sr. MRN: [id number]   Acct: [de-identified]  YOB: 1950  Admit Date: 2/20/2023  Primary Care Physician: No primary care provider on file. Advance Directive: Full Code  Health Care Proxy: Mrs. Tiney Severs, his wife, [phone]        SUBJECTIVE:    Chief Complaint   Patient presents with    Back Pain     Turned over in bed and had a sharp pain in left lower back     Shortness of Breath     HPI:  Mr. Pretty Recio is a pleasant 71year old young-for-age appearing  [place] man from home. He was referred to me for JESSICA at first, but when the recent values from \"[place]\" in Care everywhere from [place] showed a Cr of 2.2. We followed for the results of CT scans trying to find a reason for an admission as per the stated severe pain out of proportion he has been having. Three doses of Dilaudid though has only had a negligible if any impact on pain level. There is concern on my part for a infection of soft tissue. There was not significant CRP or ESR supportive of Nec Fasc as per subspecialist guidance, nor was there gas present on imaging. The infectious disease guidance was accepted and appreciated. We will follow for surgical guidance. He states that he took 5 days of a ten day prescription for a dental abscess. He states that he stopped the antibitics a few days after the tooth was pulled Amoxicillin. He states that he had diarrhea Wednesday and Thursday. He has had one BM since on Friday that was lumpy-soft , but has had clearish-yellow water for bowel movements since. Review of Systems:   Review of Systems   Constitutional:  Negative for chills, diaphoresis, fatigue and fever. HENT:  Negative for sore throat. Respiratory:  Negative for cough and shortness of breath. Cardiovascular:  Negative for chest pain.    Gastrointestinal:  Negative for abdominal pain and diarrhea. Genitourinary:  Negative for dysuria. Musculoskeletal:  Positive for back pain. Neurological:  Negative for weakness. Psychiatric/Behavioral:  Negative for confusion. Past Medical History:   Diagnosis Date    CAD (coronary artery disease)     Class 1 obesity     Colon polyps     Diabetes mellitus ([facility] [address] [address]     Hyperlipidemia     Hypertension     Kidney stone     Psoriatic arthritis ([facility] [address]     Stage 3b chronic kidney disease (CKD) ([facility] [address] GFR 30 for [facility] at [facility] on       Past Psychiatric History:  Denies any    Past Surgical History:   Procedure Laterality Date    BACK SURGERY      CATARACT EXTRACTION W/ INTRAOCULAR LENS IMPLANT Bilateral     DENTAL SURGERY  2023    lowe right wisdomn tooth extraction    FRACTURE SURGERY Right 2000    elbow    TUMOR REMOVAL      fatty tumor, at base of skull     Social History    Tobacco Use  Former; Cigarettes: [address]; 1 pack/day for 24.00 years; Pack years: 24.00   Smokeless Tobacco: Never used smokeless tobacco.   Tobacco Cessation: Counseling given: Not Answered   Vaping Use  Never used    Alcohol Use  Not Currently. Comments: in past, can not take with his medications, no drinking in 20 years   Drug Use  Not Currently; Marijuana Theresa Duke). Comments: 36 yeasr ago   Sexual Activity  Comments: has  a wife & has 2 sons     Advance Directive: Full Code  Health Care Proxy: Mrs. Ro Ford, his wife, [phone]  AMBULATION: without difficulty  DOMICILED: no stair in home in [place], but has a flight of stairs from garage here in [place] or 5 stairs for the front porch     Family History   Problem Relation Age of Onset    Heart Disease Mother         did at age 80    Heart Surgery Mother         has CABG 2 times    Heart Attack Father          og this atage 80    Heart Disease Father     Heart Surgery Sister     Heart Disease Sister     Other Brother         COVID-19    Diabetes Brother         bilateral BKA    Other Son         born with half a liver    Other Son         has mild cystic fibrosis     Allergies:   No Known Allergies    Home Medications:  Prior to Admission medications    Medication Sig Start Date End Date Taking? Authorizing Provider   Omega-3 Fatty Acids (FISH OIL) 1000 MG capsule Take by mouth 2 times daily   Yes Historical Provider, MD   HYDROcodone-acetaminophen (NORCO) 7.5-325 MG per tablet Take 1 tablet by mouth every 8 hours as needed for Pain. Yes Historical Provider, MD   insulin glargine (LANTUS) 100 UNIT/ML injection vial Inject 35 Units into the skin 2 times daily   Yes Historical Provider, MD   gabapentin (NEURONTIN) 300 MG capsule Take 300 mg by mouth 3 times daily. Yes Historical Provider, MD   gabapentin (NEURONTIN) 100 MG capsule Take 100 mg by mouth 3 times daily.    Yes Historical Provider, MD   DULoxetine (CYMBALTA) 20 MG extended release capsule Take 20 mg by mouth daily   Yes Historical Provider, MD   losartan-hydroCHLOROthiazide (HYZAAR) 100-25 MG per tablet Take 1 tablet by mouth daily   Yes Historical Provider, MD   rOPINIRole (REQUIP) 2 MG tablet Take 2 mg by mouth in the morning and at bedtime 1/2 tab in am and 1 tab at night   Yes Historical Provider, MD   doxazosin (CARDURA) 8 MG tablet Take 4 mg by mouth nightly   Yes Historical Provider, MD   cetirizine (ZYRTEC) 10 MG tablet Take 10 mg by mouth daily   Yes Historical Provider, MD   Rosuvastatin Calcium 20 MG CPSP Take 10 mg by mouth   Yes Historical Provider, MD   omeprazole (PRILOSEC) 20 MG delayed release capsule Take 20 mg by mouth daily   Yes Historical Provider, MD   clopidogrel (PLAVIX) 75 MG tablet Take 75 mg by mouth daily 1/2 tab   Yes Historical Provider, MD   b complex vitamins capsule Take 1 capsule by mouth daily   Yes Historical Provider, MD   Garlic 0372 MG CAPS Take by mouth   Yes Historical Provider, MD   amLODIPine (NORVASC) 10 MG tablet Take 10 mg by mouth daily   Yes Historical Provider, MD   ezetimibe (ZETIA) 10 MG tablet Take 10 mg by mouth daily   Yes Historical Provider, MD   empagliflozin (JARDIANCE) 25 MG tablet Take 25 mg by mouth daily   Yes Historical Provider, MD   finasteride (PROSCAR) 5 MG tablet Take 5 mg by mouth daily   Yes Historical Provider, MD   aspirin 81 MG EC tablet Take 81 mg by mouth daily   Yes Historical Provider, MD   amoxicillin (AMOXIL) 875 MG tablet Take 875 mg by mouth 2 times daily   Yes Historical Provider, MD   vitamin D 25 MCG (1000 UT) CAPS 1 capsule    Historical Provider, MD         OBJECTIVE:    Yasmeen Tai:    02/20/23 0526   BP:    Pulse: 64   Resp:    Temp:    SpO2:    Breathing room air    /64   Pulse 64   Temp 98.5 °F (36.9 °C)   Resp 20   Ht 6' 2\" (1.88 m)   Wt 250 lb (113.4 kg)   SpO2 100%   BMI 32.10 kg/m²     No intake or output data in the 24 hours ending 02/20/23 0317    Physical Exam  Vitals reviewed. Constitutional:       General: He is in acute distress. Appearance: Normal appearance. He is normal weight. He is ill-appearing. He is not toxic-appearing. HENT:      Head: Normocephalic and atraumatic. Nose: No congestion or rhinorrhea. Eyes:      General:         Right eye: No discharge. Left eye: No discharge. Neck:      Comments: Trachea appears midline, neck is supple  Cardiovascular:      Rate and Rhythm: Normal rate and regular rhythm. Heart sounds: No murmur heard. No friction rub. No gallop. Pulmonary:      Effort: Pulmonary effort is normal. No respiratory distress. Breath sounds: No stridor. No wheezing, rhonchi or rales. Chest:      Chest wall: No tenderness. Abdominal:      General: Bowel sounds are normal.      Tenderness: There is no abdominal tenderness. There is no guarding or rebound. Skin:     General: Skin is warm. Comments: Nondiaphoretic, no rashes, no creptius but feels like the skin is puffy in the area of the Left CostoVertebralAngle   Neurological:      Mental Status: He is alert.       Cranial Nerves: No dysarthria. Motor: No tremor or seizure activity. Psychiatric:         Mood and Affect: Mood normal.         Behavior: Behavior normal.        LABORATORY DATA:    CBC:   Recent Labs     02/19/23  1328 02/20/23  0440   WBC 8.1 7.7   HGB 12.4* 11.7*   HCT 35.9* 34.5*    176     BMP:   Recent Labs     02/19/23  1328 02/20/23  0440   * 135*   K 3.8 4.2    102   CO2 20* 20*   BUN 40* 34*   CREATININE 2.5* 2.3*   CALCIUM 9.1 8.8     Hepatic Profile:   Recent Labs     02/19/23  1328   AST 17   ALT 23   BILITOT 0.8   ALKPHOS 50     Coag Panel:   Recent Labs     02/20/23  0440   INR 1.09   PROTIME 14.1   APTT 31.3     Cardiac Enzymes:   Recent Labs     02/19/23  1328   CKTOTAL 73   TROPONINI <0.01     Pro-BNP:   Recent Labs     02/19/23  1328   PROBNP 95     Urinalysis:   Lab Results   Component Value Date/Time    NITRU Negative 02/20/2023 01:58 AM    WBCUA 1 02/20/2023 01:58 AM    BACTERIA NEGATIVE 02/20/2023 01:58 AM    RBCUA 3 02/20/2023 01:58 AM    BLOODU Negative 02/20/2023 01:58 AM    SPECGRAV 1.020 02/20/2023 01:58 AM    GLUCOSEU =>1000 02/20/2023 01:58 AM     IMAGING:  CT CHEST WO CONTRAST  Result Date: 2/20/2023  There are bibasilar areas of atelectasis and/or scarring. There is evidence for old granulomatous disease. There are old healed left rib fractures. There is a compression fracture of the T3 vertebral body of undetermined age. Electronically signed by Manish Dunne MD on 02-20-23 at [address]  Result Date: 2/20/2023  No acute fracture. Electronically signed by Claudia Shrestha MD on 02-20-23 at 0324  CT KIDNEY WO CONTRAST  Result Date: 2/20/2023  There is perinephric stranding. No evidence of renal calculi or hydronephrosis. Electronically signed by Manish Dunne MD on 02-20-23 at 3621  XR CHEST PORTABLE  Result Date: 2/19/2023  Atelectasis in right lung base NO EVIDENCE OF AIRSPACE CONSOLIDATION OR PULMONARY VENOUS CONGESTION.         ASESSMENTS & PLANS:    Patient Active Problem List   Diagnosis    Suspected soft tissue infection   Rule Out Nec Fasc  Place to OBServation in medical mcf[place]en  Tele  Strict Is and Os  Daily weights  ESR and CRP added and came back mildly high  ID consultation UNAVAILABLE so called and asked for general guidance from ALISHA Aguilar from [facility], he states he is open to receiving calls about this patient despite that he can not offer a formal consultation for her  Will hold off on Clinda for now as per suggestion  General Surgery Consultation will be sought  Merrem 1g IV Q8h will be begun  Vancomycin will be dosed by pharmacy  CBC with Diff daily will be obtained  Would reckeck ESR and CRP over course of observation  Dilaudid 0.5-1.0-2.0 mild-moderate-severe pain scale  Narcan PRN    Chronic medical problems:  Continue home regimen   amLODIPine  10 mg Oral Daily    aspirin  81 mg Oral Daily    b complex vitamins  1 capsule Oral Daily    clopidogrel  75 mg Oral Daily    doxazosin  4 mg Oral Nightly    DULoxetine  20 mg Oral Daily    empagliflozin  25 mg Oral Daily    ezetimibe  10 mg Oral Daily    finasteride  5 mg Oral Daily    Garlic  780 mg Oral Daily    gabapentin  300 mg Oral TID    insulin glargine  35 Units SubCUTAneous BID    fish oil  1 capsule Oral BID    vitamin D  2,000 Units Oral Daily    rOPINIRole  2 mg Oral BID    Rosuvastatin Calcium  10 mg Oral Nightly    pantoprazole  20 mg Oral QAM AC    losartan-hydroCHLOROthiazide  1 tablet Oral Daily     Supoportive and Prophylactic Txx:  DVT Prophylaxis: Lovenox SQ  GI (PUD) Ppx: not indicated as such  PT consult for evalutation and Txx as indicated: as per age would consider if he is later admitted  NPO except sips with meds and ice chips pending surgical consultation  cetirizine, sodium chloride flush, sodium chloride, ondansetron **OR** ondansetron, acetaminophen **OR** acetaminophen, potassium chloride **OR** potassium alternative oral replacement **OR** potassium chloride, magnesium sulfate, sodium phosphate IVPB, polyethylene glycol, melatonin, calcium carbonate      Care time of >65 minutes  Pt seen/examined and placed to OBServation status. Inpatient status is used for patients with an expected LOS extending past two midnights due to medical therapy and or critical care needs, otherwise patients are placed to OBServation status. Signed:  Electronically signed by Gia Donis MD on 2/20/23 at 6:55 AM CST.

## 2023-02-20 NOTE — PROGRESS NOTES
María Kline Sr. arrived to room # 306. Presented with: back pain  Mental Status: Patient is oriented, alert, and coherent. Vitals:    02/20/23 0849   BP: 125/66   Pulse: 62   Resp: 16   Temp:    SpO2: 95%     Patient safety contract and falls prevention contract reviewed with patient Yes. Oriented Patient to room. Call light within reach. Yes.   Needs, issues or concerns expressed at this time: no.      Electronically signed by Kan Tapia RN on 2/20/2023 at 9:23 AM

## 2023-02-20 NOTE — ED NOTES
Called Dr Frank Le answering service for consult.   Per answering service Dr Callie Weinstein is no longer taking new consults      Rajesh Wallace RN  02/20/23 5150

## 2023-02-20 NOTE — PROGRESS NOTES
PHARMACY NOTE  Estelle Armstrong Sr. was ordered Garlic and Fish Oil. Per the [address], this medication is non-formulary and not stocked by pharmacy. It has been discontinued. The medication can be reordered at discharge.      Electronically signed by Billy Castro, [address] [place] [address] on 2/20/2023 at 6:44 AM

## 2023-02-20 NOTE — ED PROVIDER NOTES
[facility]  eMERGENCY dEPARTMENT eNCOUnter      Pt Name: Elida Hoff Sr. MRN: [id number]  Birthdate 1950  Date of evaluation: 2/19/2023  Provider: Rafi Rm MD    CHIEF COMPLAINT       Chief Complaint   Patient presents with    Back Pain     Turned over in bed and had a sharp pain in left lower back     Shortness of Breath         HISTORY OF PRESENT ILLNESS   (Location/Symptom, Timing/Onset,Context/Setting, Quality, Duration, Modifying Factors, Severity)  Note limiting factors. Elida Hoff Sr. is a 71 y.o. male who presents to the emergency department for evaluation regarding left-sided low back pain. Patient states that this pain began after he turned over in bed. He describes the pain is fairly acute in onset and very sharp and seems exacerbated by movement. He does have a prior history of chronic kidney disease. Also reports a prior history of kidney stone but states this does not feel anything like a prior kidney stone. Describes pain that radiates down his left lower extremity. He has not had any specific fall or traumatic injury. No loss of bowel or bladder control. Denies fevers or chills. He has not had any chest pain or feelings of shortness of breath. HPI    NursingNotes were reviewed. REVIEW OF SYSTEMS    (2-9 systems for level 4, 10 or more for level 5)     Review of Systems   Constitutional:  Negative for chills and fever. Respiratory:  Negative for shortness of breath. Cardiovascular:  Negative for chest pain and palpitations. Gastrointestinal:  Negative for abdominal pain, nausea and vomiting. Genitourinary:  Negative for dysuria and hematuria. Musculoskeletal:  Positive for back pain. Neurological:  Negative for weakness and numbness. All other systems reviewed and are negative.          PAST MEDICALHISTORY     Past Medical History:   Diagnosis Date    CAD (coronary artery disease)     CKD (chronic kidney disease)     Colon polyps Diabetes mellitus ([facility] [address]     Hyperlipidemia     Hypertension     Kidney stone     Psoriatic arthritis ([facility] [address]          SURGICAL HISTORY       Past Surgical History:   Procedure Laterality Date    BACK SURGERY      FRACTURE SURGERY      TUMOR REMOVAL      fatty tumor         CURRENT MEDICATIONS     Previous Medications    AMLODIPINE (NORVASC) 10 MG TABLET    Take 10 mg by mouth daily    AMOXICILLIN (AMOXIL) 875 MG TABLET    Take 875 mg by mouth 2 times daily    ASPIRIN 81 MG EC TABLET    Take 81 mg by mouth daily    B COMPLEX VITAMINS CAPSULE    Take 1 capsule by mouth daily    CETIRIZINE (ZYRTEC) 10 MG TABLET    Take 10 mg by mouth daily    CLOPIDOGREL (PLAVIX) 75 MG TABLET    Take 75 mg by mouth daily 1/2 tab    DOXAZOSIN (CARDURA) 8 MG TABLET    Take 4 mg by mouth nightly    DULOXETINE (CYMBALTA) 20 MG EXTENDED RELEASE CAPSULE    Take 20 mg by mouth daily    EMPAGLIFLOZIN (JARDIANCE) 25 MG TABLET    Take 25 mg by mouth daily    EZETIMIBE (ZETIA) 10 MG TABLET    Take 10 mg by mouth daily    FINASTERIDE (PROSCAR) 5 MG TABLET    Take 5 mg by mouth daily    GABAPENTIN (NEURONTIN) 100 MG CAPSULE    Take 100 mg by mouth 3 times daily. GABAPENTIN (NEURONTIN) 300 MG CAPSULE    Take 300 mg by mouth 3 times daily. GARLIC 9422 MG CAPS    Take by mouth    HYDROCODONE-ACETAMINOPHEN (NORCO) 7.5-325 MG PER TABLET    Take 1 tablet by mouth every 8 hours as needed for Pain.     INSULIN GLARGINE (LANTUS) 100 UNIT/ML INJECTION VIAL    Inject 35 Units into the skin 2 times daily    LOSARTAN-HYDROCHLOROTHIAZIDE (HYZAAR) 100-25 MG PER TABLET    Take 1 tablet by mouth daily    OMEGA-3 FATTY ACIDS (FISH OIL) 1000 MG CAPSULE    Take by mouth 2 times daily    OMEPRAZOLE (PRILOSEC) 20 MG DELAYED RELEASE CAPSULE    Take 20 mg by mouth daily    ROPINIROLE (REQUIP) 2 MG TABLET    Take 2 mg by mouth in the morning and at bedtime 1/2 tab in am and 1 tab at night    ROSUVASTATIN CALCIUM 20 MG CPSP    Take 10 mg by mouth    VITAMIN D 25 MCG (1000 UT) CAPS    1 capsule       ALLERGIES     Patient has no known allergies. FAMILY HISTORY     No family history on file. SOCIAL HISTORY       Social History     Socioeconomic History    Marital status:    Tobacco Use    Smoking status: Former     Types: Cigarettes    Smokeless tobacco: Never   Vaping Use    Vaping Use: Never used   Substance and Sexual Activity    Alcohol use: Not Currently    Drug use: Never       SCREENINGS    Michelle Coma Scale  Eye Opening: Spontaneous  Best Verbal Response: Oriented  Best Motor Response: Obeys commands  [place] Coma Scale Score: 15        PHYSICAL EXAM    (up to 7 for level 4, 8 or more for level 5)     ED Triage Vitals [02/19/23 1256]   BP Temp Temp src Heart Rate Resp SpO2 Height Weight   (!) 153/72 98.5 °F (36.9 °C) -- 80 22 96 % 6' 2\" (1.88 m) 250 lb (113.4 kg)       Physical Exam  Vitals and nursing note reviewed. HENT:      Head: Atraumatic. Mouth/Throat:      Mouth: Mucous membranes are moist. Mucous membranes are not dry. Eyes:      General: No scleral icterus. Pupils: Pupils are equal, round, and reactive to light. Neck:      Trachea: No tracheal deviation. Cardiovascular:      Rate and Rhythm: Normal rate and regular rhythm. Pulses: Normal pulses. Heart sounds: Normal heart sounds. No murmur heard. Pulmonary:      Effort: Pulmonary effort is normal. No respiratory distress. Breath sounds: Normal breath sounds. No stridor. Abdominal:      General: There is no distension. Palpations: Abdomen is soft. Tenderness: There is no abdominal tenderness. There is no guarding. Musculoskeletal:      Lumbar back: Tenderness present. No bony tenderness. Back:       Right lower leg: No edema. Left lower leg: No edema. Skin:     Capillary Refill: Capillary refill takes less than 2 seconds. Coloration: Skin is not pale. Findings: No rash.    Neurological:      Mental Status: He is alert and oriented to person, place, and time.   Psychiatric:         Behavior: Behavior is cooperative.       DIAGNOSTIC RESULTS     EKG: All EKG's areinterpreted by the Emergency Department Physician who either signs or Co-signs this chart in the absence of a cardiologist.    1358: Normal sinus rhythm at a rate of 75 with no evidence of acute ST or T wave change identified.  QTc: 470 MS.    RADIOLOGY:  Non-plain film images such as CT, Ultrasound and MRI are read by the radiologist. Plain radiographic images are visualized and preliminarily interpreted bythe emergency physician with the below findings:        XR CHEST PORTABLE   Final Result   Atelectasis in right lung base   NO EVIDENCE OF AIRSPACE CONSOLIDATION OR PULMONARY VENOUS CONGESTION.      CT LUMBAR SPINE WO CONTRAST    (Results Pending)           LABS:  Labs Reviewed   CBC WITH AUTO DIFFERENTIAL - Abnormal; Notable for the following components:       Result Value    RBC 4.09 (*)     Hemoglobin 12.4 (*)     Hematocrit 35.9 (*)     MPV 9.3 (*)     Neutrophils % 69.7 (*)     All other components within normal limits   COMPREHENSIVE METABOLIC PANEL - Abnormal; Notable for the following components:    Sodium 135 (*)     CO2 20 (*)     Glucose 185 (*)     BUN 40 (*)     Creatinine 2.5 (*)     Est, Glom Filt Rate 27 (*)     All other components within normal limits   COVID-19, RAPID   BRAIN NATRIURETIC PEPTIDE   TROPONIN       All other labs were within normal range or not returned as of this dictation.    EMERGENCY DEPARTMENT COURSE and DIFFERENTIAL DIAGNOSIS/MDM:   Vitals:    Vitals:    02/19/23 1800 02/19/23 1926 02/19/23 2057 02/19/23 2248   BP: (!) 152/70 (!) 148/73 (!) 168/71 (!) 152/70   Pulse: 90 64 65 69   Resp: 18 20 20    Temp:       SpO2: 92% 98% 97% 97%   Weight:       Height:           MDM     Amount and/or Complexity of Data Reviewed  Clinical lab tests: ordered and reviewed  Tests in the radiology section of CPT®: ordered    I have independently reviewed  patient's laboratory studies which reveal normal WBC count of 8.1. His serum creatinine is 2.5/BUN 40 with a normal serum potassium of 3.8. His GFR is normally about 26 which is very similar to his GFR today. His cardiac biomarker is negative. EKG does not reveal any acute changes. His vital signs are otherwise stable. Patient continues to have moderate severity pain that is exacerbated by movement. He has had very little relief after 2 doses of IV morphine. I have ordered him a dose of IV Dilaudid along with some IM Norflex. We will plan to obtain a CT scan to exclude severe disc herniation as a cause of his symptoms. Have also added on a CT renal stone protocol to exclude left ureteral calculus. PROCEDURES:  Unless otherwise noted below, none     Procedures    FINAL IMPRESSION    No diagnosis found. DISPOSITION/PLAN   DISPOSITION        PATIENT REFERRED TO:  No follow-up provider specified.     DISCHARGE MEDICATIONS:  New Prescriptions    No medications on file          (Please note that portions of this note were completed with a voice recognition program.  Efforts were made to edit thedictations but occasionally words are mis-transcribed.)    Almedia Gowers, MD (electronically signed)  Attending Emergency Physician

## 2023-02-20 NOTE — PLAN OF CARE
Problem: Pain  Goal: Verbalizes/displays adequate comfort level or baseline comfort level  Outcome: Not Progressing     Problem: Safety - Adult  Goal: Free from fall injury  Outcome: Not Progressing     Problem: ABCDS Injury Assessment  Goal: Absence of physical injury  Outcome: Not Progressing     Problem: Discharge Planning  Goal: Discharge to home or other facility with appropriate resources  Outcome: Not Progressing  Flowsheets  Taken 2/20/2023 1130  Discharge to home or other facility with appropriate resources:   Identify barriers to discharge with patient and caregiver   Identify discharge learning needs (meds, wound care, etc)   Refer to discharge planning if patient needs post-hospital services based on physician order or complex needs related to functional status, cognitive ability or social support system   Arrange for interpreters to assist at discharge as needed   Arrange for needed discharge resources and transportation as appropriate  Taken 2/20/2023 0923  Discharge to home or other facility with appropriate resources:   Identify barriers to discharge with patient and caregiver   Arrange for needed discharge resources and transportation as appropriate   Identify discharge learning needs (meds, wound care, etc)   Arrange for interpreters to assist at discharge as needed   Refer to discharge planning if patient needs post-hospital services based on physician order or complex needs related to functional status, cognitive ability or social support system     Problem: Pain  Goal: Verbalizes/displays adequate comfort level or baseline comfort level  Outcome: Not Progressing     Problem: Safety - Adult  Goal: Free from fall injury  Outcome: Not Progressing     Problem: ABCDS Injury Assessment  Goal: Absence of physical injury  Outcome: Not Progressing

## 2023-02-20 NOTE — ED NOTES
PT assisted to bathroom x 1 without incident. PT c/o severe back pain with movement, otherwise no gait deficit or acute distress noted.       Arvind Thomas RN  02/19/23 2053

## 2023-02-20 NOTE — PROGRESS NOTES
4 Eyes Skin Assessment    Angi Jim is being assessed upon: Admission    I agree that I, Levon Murray, RN, along with Rossana Kline (either 2 RN's or 1 LPN and 1 RN) have performed a thorough Head to Toe Skin Assessment on the patient. ALL assessment sites listed below have been assessed. Areas assessed by both nurses:     [x]   Head, Face, and Ears   [x]   Shoulders, Back, and Chest  [x]   Arms, Elbows, and Hands   [x]   Coccyx, Sacrum, and Ischium  [x]   Legs, Feet, and Heels    Does the Patient have Skin Breakdown? Yes, wound(s) noted upon assessment. It is the responsibility of the Primary Nurse to assure that the following documentation, preventions, orders, and consults are complete on the above noted wound(s): Wound LDA initiated. LDA Flowsheet Documentation includes the Vicky-wound, Wound Assessment, Measurements, Dressing Treatment, Drainage, and Color.     Uli Prevention initiated: psoriasis-no open areas  Wound Care Orders initiated: NA    [facility] nurse consulted for Pressure Injury (Stage 3,4, Unstageable, DTI, NWPT, and Complex wounds) and New or Established Ostomies: NA        Primary Nurse eSignature: Levon Murray RN on 2/20/2023 at 10:43 AM      Co-Signer eSignature: {Esignature:[id number]}

## 2023-02-21 ENCOUNTER — APPOINTMENT (OUTPATIENT)
Dept: MRI IMAGING | Age: 73
DRG: 552 | End: 2023-02-21
Payer: OTHER GOVERNMENT

## 2023-02-21 PROBLEM — R10.9 FLANK PAIN: Status: ACTIVE | Noted: 2023-02-21

## 2023-02-21 LAB
ANION GAP SERPL CALCULATED.3IONS-SCNC: 14 MMOL/L (ref 7–19)
BASOPHILS ABSOLUTE: 0 K/UL (ref 0–0.2)
BASOPHILS RELATIVE PERCENT: 0.2 % (ref 0–1)
BUN BLDV-MCNC: 33 MG/DL (ref 8–23)
C-REACTIVE PROTEIN: 7.81 MG/DL (ref 0–0.5)
CALCIUM SERPL-MCNC: 9.2 MG/DL (ref 8.8–10.2)
CHLORIDE BLD-SCNC: 102 MMOL/L (ref 98–111)
CO2: 21 MMOL/L (ref 22–29)
CREAT SERPL-MCNC: 2.4 MG/DL (ref 0.5–1.2)
EOSINOPHILS ABSOLUTE: 0 K/UL (ref 0–0.6)
EOSINOPHILS RELATIVE PERCENT: 0.2 % (ref 0–5)
FERRITIN: 159.7 NG/ML (ref 30–400)
FOLATE: >20 NG/ML (ref 4.5–32.2)
GFR SERPL CREATININE-BSD FRML MDRD: 28 ML/MIN/{1.73_M2}
GLUCOSE BLD-MCNC: 146 MG/DL (ref 70–99)
GLUCOSE BLD-MCNC: 171 MG/DL (ref 70–99)
GLUCOSE BLD-MCNC: 174 MG/DL (ref 70–99)
GLUCOSE BLD-MCNC: 178 MG/DL (ref 74–109)
GLUCOSE BLD-MCNC: 206 MG/DL (ref 70–99)
GLUCOSE BLD-MCNC: 212 MG/DL (ref 70–99)
HCT VFR BLD CALC: 32.8 % (ref 42–52)
HEMOGLOBIN: 11.3 G/DL (ref 14–18)
IMMATURE GRANULOCYTES #: 0 K/UL
IRON SATURATION: 8 % (ref 14–50)
IRON: 20 UG/DL (ref 59–158)
LYMPHOCYTES ABSOLUTE: 1.5 K/UL (ref 1.1–4.5)
LYMPHOCYTES RELATIVE PERCENT: 15.8 % (ref 20–40)
MAGNESIUM: 2.1 MG/DL (ref 1.6–2.4)
MCH RBC QN AUTO: 30.1 PG (ref 27–31)
MCHC RBC AUTO-ENTMCNC: 34.5 G/DL (ref 33–37)
MCV RBC AUTO: 87.5 FL (ref 80–94)
MONOCYTES ABSOLUTE: 0.7 K/UL (ref 0–0.9)
MONOCYTES RELATIVE PERCENT: 7.5 % (ref 0–10)
NEUTROPHILS ABSOLUTE: 7.1 K/UL (ref 1.5–7.5)
NEUTROPHILS RELATIVE PERCENT: 76 % (ref 50–65)
PDW BLD-RTO: 13.2 % (ref 11.5–14.5)
PERFORMED ON: ABNORMAL
PLATELET # BLD: 177 K/UL (ref 130–400)
PMV BLD AUTO: 9.5 FL (ref 9.4–12.4)
POTASSIUM REFLEX MAGNESIUM: 4 MMOL/L (ref 3.5–5)
PRO-BNP: 301 PG/ML (ref 0–900)
RBC # BLD: 3.75 M/UL (ref 4.7–6.1)
SEDIMENTATION RATE, ERYTHROCYTE: 60 MM/HR (ref 0–15)
SODIUM BLD-SCNC: 137 MMOL/L (ref 136–145)
TOTAL CK: 224 U/L (ref 39–308)
TOTAL IRON BINDING CAPACITY: 246 UG/DL (ref 250–400)
TROPONIN: <0.01 NG/ML (ref 0–0.03)
TSH SERPL DL<=0.05 MIU/L-ACNC: 0.59 UIU/ML (ref 0.27–4.2)
VANCOMYCIN TROUGH: 15.7 UG/ML (ref 10–20)
VITAMIN B-12: 803 PG/ML (ref 211–946)
VITAMIN D 25-HYDROXY: 34.9 NG/ML
WBC # BLD: 9.3 K/UL (ref 4.8–10.8)

## 2023-02-21 PROCEDURE — 85025 COMPLETE CBC W/AUTO DIFF WBC: CPT

## 2023-02-21 PROCEDURE — 72148 MRI LUMBAR SPINE W/O DYE: CPT

## 2023-02-21 PROCEDURE — 6360000002 HC RX W HCPCS: Performed by: HOSPITALIST

## 2023-02-21 PROCEDURE — 84484 ASSAY OF TROPONIN QUANT: CPT

## 2023-02-21 PROCEDURE — 86140 C-REACTIVE PROTEIN: CPT

## 2023-02-21 PROCEDURE — 6370000000 HC RX 637 (ALT 250 FOR IP): Performed by: HOSPITALIST

## 2023-02-21 PROCEDURE — 82550 ASSAY OF CK (CPK): CPT

## 2023-02-21 PROCEDURE — 80048 BASIC METABOLIC PNL TOTAL CA: CPT

## 2023-02-21 PROCEDURE — 94760 N-INVAS EAR/PLS OXIMETRY 1: CPT

## 2023-02-21 PROCEDURE — 2580000003 HC RX 258: Performed by: HOSPITALIST

## 2023-02-21 PROCEDURE — 80202 ASSAY OF VANCOMYCIN: CPT

## 2023-02-21 PROCEDURE — 83550 IRON BINDING TEST: CPT

## 2023-02-21 PROCEDURE — 1210000000 HC MED SURG R&B

## 2023-02-21 PROCEDURE — 84443 ASSAY THYROID STIM HORMONE: CPT

## 2023-02-21 PROCEDURE — 85652 RBC SED RATE AUTOMATED: CPT

## 2023-02-21 PROCEDURE — 83880 ASSAY OF NATRIURETIC PEPTIDE: CPT

## 2023-02-21 PROCEDURE — 82607 VITAMIN B-12: CPT

## 2023-02-21 PROCEDURE — 83540 ASSAY OF IRON: CPT

## 2023-02-21 PROCEDURE — 2700000000 HC OXYGEN THERAPY PER DAY

## 2023-02-21 PROCEDURE — 82962 GLUCOSE BLOOD TEST: CPT

## 2023-02-21 PROCEDURE — 82306 VITAMIN D 25 HYDROXY: CPT

## 2023-02-21 PROCEDURE — 83735 ASSAY OF MAGNESIUM: CPT

## 2023-02-21 PROCEDURE — 82728 ASSAY OF FERRITIN: CPT

## 2023-02-21 PROCEDURE — 36415 COLL VENOUS BLD VENIPUNCTURE: CPT

## 2023-02-21 PROCEDURE — 82746 ASSAY OF FOLIC ACID SERUM: CPT

## 2023-02-21 RX ORDER — METHYLPREDNISOLONE SODIUM SUCCINATE 40 MG/ML
40 INJECTION, POWDER, LYOPHILIZED, FOR SOLUTION INTRAMUSCULAR; INTRAVENOUS ONCE
Status: COMPLETED | OUTPATIENT
Start: 2023-02-21 | End: 2023-02-21

## 2023-02-21 RX ORDER — LIDOCAINE 4 G/G
1 PATCH TOPICAL DAILY
Status: DISCONTINUED | OUTPATIENT
Start: 2023-02-21 | End: 2023-02-23 | Stop reason: HOSPADM

## 2023-02-21 RX ORDER — HYDROMORPHONE HYDROCHLORIDE 1 MG/ML
1 INJECTION, SOLUTION INTRAMUSCULAR; INTRAVENOUS; SUBCUTANEOUS EVERY 4 HOURS PRN
Status: DISCONTINUED | OUTPATIENT
Start: 2023-02-21 | End: 2023-02-22

## 2023-02-21 RX ORDER — SODIUM BICARBONATE 650 MG/1
650 TABLET ORAL 4 TIMES DAILY
Status: DISCONTINUED | OUTPATIENT
Start: 2023-02-21 | End: 2023-02-23 | Stop reason: HOSPADM

## 2023-02-21 RX ADMIN — IRON SUCROSE 200 MG: 20 INJECTION, SOLUTION INTRAVENOUS at 16:22

## 2023-02-21 RX ADMIN — ANTACID TABLETS 500 MG: 500 TABLET, CHEWABLE ORAL at 05:08

## 2023-02-21 RX ADMIN — SODIUM CHLORIDE, PRESERVATIVE FREE 10 ML: 5 INJECTION INTRAVENOUS at 08:16

## 2023-02-21 RX ADMIN — INSULIN GLARGINE 35 UNITS: 100 INJECTION, SOLUTION SUBCUTANEOUS at 21:24

## 2023-02-21 RX ADMIN — PANTOPRAZOLE SODIUM 20 MG: 20 TABLET, DELAYED RELEASE ORAL at 05:25

## 2023-02-21 RX ADMIN — EMPAGLIFLOZIN 25 MG: 25 TABLET, FILM COATED ORAL at 08:12

## 2023-02-21 RX ADMIN — FINASTERIDE 5 MG: 5 TABLET, FILM COATED ORAL at 08:09

## 2023-02-21 RX ADMIN — HYDROMORPHONE HYDROCHLORIDE 1 MG: 1 INJECTION, SOLUTION INTRAMUSCULAR; INTRAVENOUS; SUBCUTANEOUS at 08:09

## 2023-02-21 RX ADMIN — CLOPIDOGREL BISULFATE 75 MG: 75 TABLET ORAL at 08:09

## 2023-02-21 RX ADMIN — ROPINIROLE HYDROCHLORIDE 2 MG: 2 TABLET, FILM COATED ORAL at 08:08

## 2023-02-21 RX ADMIN — MEPERIDINE HYDROCHLORIDE 25 MG: 25 INJECTION, SOLUTION INTRAMUSCULAR; INTRAVENOUS; SUBCUTANEOUS at 22:50

## 2023-02-21 RX ADMIN — AMLODIPINE BESYLATE 10 MG: 5 TABLET ORAL at 08:08

## 2023-02-21 RX ADMIN — SODIUM BICARBONATE 650 MG: 650 TABLET ORAL at 12:39

## 2023-02-21 RX ADMIN — MEPERIDINE HYDROCHLORIDE 25 MG: 25 INJECTION, SOLUTION INTRAMUSCULAR; INTRAVENOUS; SUBCUTANEOUS at 05:04

## 2023-02-21 RX ADMIN — DOXAZOSIN 4 MG: 2 TABLET ORAL at 08:08

## 2023-02-21 RX ADMIN — MEPERIDINE HYDROCHLORIDE 25 MG: 25 INJECTION, SOLUTION INTRAMUSCULAR; INTRAVENOUS; SUBCUTANEOUS at 10:57

## 2023-02-21 RX ADMIN — TIZANIDINE 4 MG: 4 TABLET ORAL at 12:41

## 2023-02-21 RX ADMIN — SODIUM BICARBONATE 650 MG: 650 TABLET ORAL at 08:09

## 2023-02-21 RX ADMIN — HYDROMORPHONE HYDROCHLORIDE 1 MG: 1 INJECTION, SOLUTION INTRAMUSCULAR; INTRAVENOUS; SUBCUTANEOUS at 19:44

## 2023-02-21 RX ADMIN — METHYLPREDNISOLONE SODIUM SUCCINATE 40 MG: 40 INJECTION, POWDER, FOR SOLUTION INTRAMUSCULAR; INTRAVENOUS at 16:23

## 2023-02-21 RX ADMIN — HYDROCHLOROTHIAZIDE 25 MG: 25 TABLET ORAL at 08:08

## 2023-02-21 RX ADMIN — SODIUM BICARBONATE 650 MG: 650 TABLET ORAL at 21:23

## 2023-02-21 RX ADMIN — ROSUVASTATIN CALCIUM 10 MG: 10 TABLET, FILM COATED ORAL at 08:08

## 2023-02-21 RX ADMIN — HYDROMORPHONE HYDROCHLORIDE 1 MG: 1 INJECTION, SOLUTION INTRAMUSCULAR; INTRAVENOUS; SUBCUTANEOUS at 13:06

## 2023-02-21 RX ADMIN — LOSARTAN POTASSIUM 100 MG: 100 TABLET, FILM COATED ORAL at 08:09

## 2023-02-21 RX ADMIN — ASPIRIN 81 MG: 81 TABLET, COATED ORAL at 08:08

## 2023-02-21 RX ADMIN — INSULIN GLARGINE 35 UNITS: 100 INJECTION, SOLUTION SUBCUTANEOUS at 08:13

## 2023-02-21 RX ADMIN — MEPERIDINE HYDROCHLORIDE 25 MG: 25 INJECTION, SOLUTION INTRAMUSCULAR; INTRAVENOUS; SUBCUTANEOUS at 00:15

## 2023-02-21 RX ADMIN — SODIUM BICARBONATE 650 MG: 650 TABLET ORAL at 18:29

## 2023-02-21 RX ADMIN — DULOXETINE 20 MG: 20 CAPSULE, DELAYED RELEASE ORAL at 08:08

## 2023-02-21 RX ADMIN — MEPERIDINE HYDROCHLORIDE 25 MG: 25 INJECTION, SOLUTION INTRAMUSCULAR; INTRAVENOUS; SUBCUTANEOUS at 16:23

## 2023-02-21 RX ADMIN — ORPHENADRINE CITRATE 60 MG: 30 INJECTION INTRAMUSCULAR; INTRAVENOUS at 05:03

## 2023-02-21 RX ADMIN — EZETIMIBE 10 MG: 10 TABLET ORAL at 08:08

## 2023-02-21 RX ADMIN — ENOXAPARIN SODIUM 30 MG: 100 INJECTION SUBCUTANEOUS at 21:23

## 2023-02-21 RX ADMIN — VANCOMYCIN HYDROCHLORIDE 1250 MG: 10 INJECTION, POWDER, LYOPHILIZED, FOR SOLUTION INTRAVENOUS at 05:43

## 2023-02-21 RX ADMIN — INSULIN LISPRO 2 UNITS: 100 INJECTION, SOLUTION INTRAVENOUS; SUBCUTANEOUS at 12:39

## 2023-02-21 RX ADMIN — GABAPENTIN 300 MG: 300 CAPSULE ORAL at 16:22

## 2023-02-21 RX ADMIN — GABAPENTIN 300 MG: 300 CAPSULE ORAL at 21:23

## 2023-02-21 RX ADMIN — Medication 1 TABLET: at 08:08

## 2023-02-21 RX ADMIN — MEROPENEM 1000 MG: 1 INJECTION, POWDER, FOR SOLUTION INTRAVENOUS at 04:52

## 2023-02-21 RX ADMIN — ENOXAPARIN SODIUM 30 MG: 100 INJECTION SUBCUTANEOUS at 08:09

## 2023-02-21 RX ADMIN — Medication 2000 UNITS: at 08:09

## 2023-02-21 RX ADMIN — ROPINIROLE HYDROCHLORIDE 2 MG: 2 TABLET, FILM COATED ORAL at 21:23

## 2023-02-21 RX ADMIN — GABAPENTIN 300 MG: 300 CAPSULE ORAL at 08:08

## 2023-02-21 ASSESSMENT — PAIN DESCRIPTION - LOCATION
LOCATION: BACK
LOCATION: OTHER (COMMENT)
LOCATION: BACK
LOCATION: OTHER (COMMENT)
LOCATION: BACK
LOCATION: BACK

## 2023-02-21 ASSESSMENT — PAIN - FUNCTIONAL ASSESSMENT

## 2023-02-21 ASSESSMENT — PAIN SCALES - GENERAL
PAINLEVEL_OUTOF10: 9
PAINLEVEL_OUTOF10: 10
PAINLEVEL_OUTOF10: 4
PAINLEVEL_OUTOF10: 9
PAINLEVEL_OUTOF10: 10
PAINLEVEL_OUTOF10: 5
PAINLEVEL_OUTOF10: 9
PAINLEVEL_OUTOF10: 6
PAINLEVEL_OUTOF10: 7
PAINLEVEL_OUTOF10: 9
PAINLEVEL_OUTOF10: 0
PAINLEVEL_OUTOF10: 5
PAINLEVEL_OUTOF10: 10
PAINLEVEL_OUTOF10: 10
PAINLEVEL_OUTOF10: 7

## 2023-02-21 ASSESSMENT — PAIN DESCRIPTION - ORIENTATION
ORIENTATION: UPPER
ORIENTATION: UPPER
ORIENTATION: LEFT
ORIENTATION: UPPER
ORIENTATION: UPPER
ORIENTATION: RIGHT;LEFT;MID
ORIENTATION: UPPER;RIGHT
ORIENTATION: RIGHT;LEFT;MID
ORIENTATION: LEFT

## 2023-02-21 ASSESSMENT — PAIN DESCRIPTION - DESCRIPTORS
DESCRIPTORS: ACHING
DESCRIPTORS: ACHING;CRAMPING
DESCRIPTORS: ACHING
DESCRIPTORS: ACHING;CRAMPING
DESCRIPTORS: ACHING

## 2023-02-21 ASSESSMENT — PAIN SCALES - WONG BAKER: WONGBAKER_NUMERICALRESPONSE: 0

## 2023-02-21 NOTE — PLAN OF CARE
Problem: Discharge Planning  Goal: Discharge to home or other facility with appropriate resources  2/21/2023 1321 by Tamy Magana RN  Outcome: Progressing  2/21/2023 0056 by Pro Hooker RN  Outcome: Progressing  Flowsheets (Taken 2/20/2023 2130)  Discharge to home or other facility with appropriate resources:   Identify barriers to discharge with patient and caregiver   Arrange for needed discharge resources and transportation as appropriate     Problem: Pain  Goal: Verbalizes/displays adequate comfort level or baseline comfort level  2/21/2023 1321 by Tamy Magana RN  Outcome: Progressing  2/21/2023 0056 by Pro Hooker RN  Outcome: Progressing     Problem: Safety - Adult  Goal: Free from fall injury  2/21/2023 1321 by Tamy Magana RN  Outcome: Progressing  2/21/2023 0056 by Pro Hooker RN  Outcome: Progressing     Problem: ABCDS Injury Assessment  Goal: Absence of physical injury  2/21/2023 1321 by Tamy Magana RN  Outcome: Progressing  2/21/2023 0056 by Pro Hooker RN  Outcome: Progressing

## 2023-02-21 NOTE — PROGRESS NOTES
[facility] MEDICINE  - PROGRESS NOTE    Admit Date: 2/19/2023         CC: severe low back pain    Subjective: severe low back pain, no neurological deficits, no fever or chills, no N/V/abd pain/D/C, no dysuria or hematuria, no chest pain, no dyspnea     Objective: severe distress      70 yo male with sudden onset low back pain/flank pain. The pain is worse on his left than his right. He states this started when rolling over in bed. The pain is worse with movement and it is tender to touch. Started on IV ab and IV pain control. He denies having this pain this severe int he past. He has had spinal surgery in the past. He denies dysuria or change in any urinary symptoms. He has not lost control of his bladder or bowels. He states his right leg it starting to fell like it is falling asleep. Denies fever or chills. MRI L spine  Lumbar spondylosis with bilateral neural foraminal stenosis at L3-L4, L4-L5, and   L5-S1. No significant central spinal canal stenosis is identified within the   lumbar spine. Please see above for additional details. Renal US  There is no evidence of hydronephrosis. The kidneys demonstrate diffusely increased cortical echogenicity, suggestive of   chronic medical renal disease. CT chest   There are bibasilar areas of atelectasis and/or scarring. There is evidence for old granulomatous disease. There are old healed left rib fractures. There is a compression fracture of the T3 vertebral body of undetermined age    CT kidney  There is perinephric stranding. No evidence of renal calculi or hydronephrosis    CT L spine  No acute fracture    Diet: ADULT DIET; Regular; 4 carb choices (60 gm/meal);  Low Potassium (Less than 3000 mg/day)  Pain is:Severe  Nausea:None  Bowel Movement/Flatus yes    Data:   Scheduled Meds: Reviewed  Current Facility-Administered Medications   Medication Dose Route Frequency Provider Last Rate Last Admin lidocaine 4 % external patch 1 patch  1 patch TransDERmal Daily Ileana Ford MD   1 patch at 02/21/23 0045    sodium bicarbonate tablet 650 mg  650 mg Oral 4x Daily Yin Cervantes MD   650 mg at 02/21/23 1239    HYDROmorphone HCl PF (DILAUDID) injection 1 mg  1 mg IntraVENous Q4H PRN Yin Cervantes MD   1 mg at 02/21/23 1306    vancomycin (VANCOCIN) 1,250 mg in sodium chloride 0.9 % 250 mL IVPB  1,250 mg IntraVENous Once Yin Cervantes MD        methylPREDNISolone sodium (SOLU-MEDROL) injection 40 mg  40 mg IntraVENous Once Yin Cervantes MD        iron sucrose (VENOFER) injection 200 mg  200 mg IntraVENous Q24H Yin Cervantes MD        amLODIPine (NORVASC) tablet 10 mg  10 mg Oral Daily Ileana Ford MD   10 mg at 02/21/23 3354    aspirin EC tablet 81 mg  81 mg Oral Daily Ileana Ford MD   81 mg at 02/21/23 3479    vitamin B and C (TOTAL B-C) 1 tablet  1 tablet Oral Daily Ileana Ford MD   1 tablet at 02/21/23 6489    clopidogrel (PLAVIX) tablet 75 mg  75 mg Oral Daily Ileana Ford MD   75 mg at 02/21/23 0809    doxazosin (CARDURA) tablet 4 mg  4 mg Oral Daily Ileana Ford MD   4 mg at 02/21/23 1235    DULoxetine (CYMBALTA) extended release capsule 20 mg  20 mg Oral Daily Ileana Ford MD   20 mg at 02/21/23 9553    empagliflozin (JARDIANCE) tablet 25 mg  25 mg Oral Daily Ileana Ford MD   25 mg at 02/21/23 9474    ezetimibe (ZETIA) tablet 10 mg  10 mg Oral Daily Ileana Ford MD   10 mg at 02/21/23 9464    finasteride (PROSCAR) tablet 5 mg  5 mg Oral Daily Ileana Ford MD   5 mg at 02/21/23 0809    gabapentin (NEURONTIN) capsule 300 mg  300 mg Oral TID Ileana Ford MD   300 mg at 02/21/23 3013    insulin glargine (LANTUS) injection vial 35 Units  35 Units SubCUTAneous BID Ileana Ford MD   35 Units at 02/21/23 0813    Vitamin D (CHOLECALCIFEROL) tablet 2,000 Units  2,000 Units Oral Daily Ileana Ford MD   2,000 Units at 02/21/23 0809    rOPINIRole (REQUIP) tablet 2 mg  2 mg Oral BID Wendy Reyes MD   2 mg at 02/21/23 0570    rosuvastatin (CRESTOR) tablet 10 mg  10 mg Oral Daily Wendy Reyes MD   10 mg at 02/21/23 7245    pantoprazole (PROTONIX) tablet 20 mg  20 mg Oral QAM AC Wendy Reyes MD   20 mg at 02/21/23 0525    sodium chloride flush 0.9 % injection 5-40 mL  5-40 mL IntraVENous 2 times per day Wendy Reyes MD   10 mL at 02/21/23 0816    sodium chloride flush 0.9 % injection 5-40 mL  5-40 mL IntraVENous PRN Wendy Reyes MD   10 mL at 02/20/23 1651    0.9 % sodium chloride infusion   IntraVENous PRN Wendy Reyes MD        enoxaparin Sodium (LOVENOX) injection 30 mg  30 mg SubCUTAneous BID Wendy Reyes MD   30 mg at 02/21/23 0809    ondansetron (ZOFRAN-ODT) disintegrating tablet 4 mg  4 mg Oral Q8H PRN Wendy Reyes MD        Or    ondansetron [facility]) injection 4 mg  4 mg IntraVENous Q6H PRN Wendy Reyes MD        acetaminophen (TYLENOL) tablet 650 mg  650 mg Oral Q4H PRN Wendy Reyes MD        Or    acetaminophen (TYLENOL) suppository 650 mg  650 mg Rectal Q6H PRN Wendy Reyes MD        potassium chloride (KLOR-CON M) extended release tablet 40 mEq  40 mEq Oral PRN Wendy Reyes MD        Or    potassium bicarb-citric acid (EFFER-K) effervescent tablet 40 mEq  40 mEq Oral PRN Wendy Reyes MD        Or    potassium chloride 10 mEq/100 mL IVPB (Peripheral Line)  10 mEq IntraVENous PRN Wendy Reyes MD        magnesium sulfate 2000 mg in 50 mL IVPB premix  2,000 mg IntraVENous PRN Wendy Reyes MD        sodium phosphate 36.3 mmol in sodium chloride 0.9 % 250 mL IVPB  0.32 mmol/kg IntraVENous PRN Wendy Reyes MD        polyethylene glycol (GLYCOLAX) packet 17 g  17 g Oral Daily PRN Wendy Reyes MD        melatonin disintegrating tablet 5 mg  5 mg Oral Nightly PRN Wendy Reyes MD        calcium carbonate (TUMS) chewable tablet 500 mg  500 mg Oral TID PRN Wendy Reyes MD   500 mg at 02/21/23 0508    insulin lispro (HUMALOG) injection vial 0-8 Units  0-8 Units SubCUTAneous TID WC Latasha King MD   2 Units at 02/21/23 1239    insulin lispro (HUMALOG) injection vial 0-4 Units  0-4 Units SubCUTAneous Nightly Latasha King MD        glucose chewable tablet 16 g  4 tablet Oral PRN Latasha King MD        dextrose bolus 10% 125 mL  125 mL IntraVENous PRN Latasha King MD        Or    dextrose bolus 10% 250 mL  250 mL IntraVENous PRN Latasha King MD        glucagon (rDNA) injection 1 mg  1 mg SubCUTAneous PRN Latasha King MD        dextrose 10 % infusion   IntraVENous Continuous PRN Latasha King MD        naloxone [facility]) injection 0.4 mg  0.4 mg IntraVENous PRN Latasha King MD        losartan (COZAAR) tablet 100 mg  100 mg Oral Daily Latasha King MD   100 mg at 02/21/23 3066    And    hydroCHLOROthiazide (HYDRODIURIL) tablet 25 mg  25 mg Oral Daily Latasha King MD   25 mg at 02/21/23 7900    meperidine (DEMEROL) injection 25 mg  25 mg IntraVENous Q4H PRN Jana Primrose, MD   25 mg at 02/21/23 1057    orphenadrine (NORFLEX) injection 60 mg  60 mg IntraMUSCular Q12H Jana Primrose, MD   60 mg at 02/21/23 0503    tiZANidine (ZANAFLEX) tablet 4 mg  4 mg Oral Q6H PRN Jana Primrose, MD   4 mg at 02/21/23 1241     DVT Prophylaxis: Lovenox 40 mg sq daily    Continuous Infusions:   sodium chloride      dextrose         Intake/Output Summary (Last 24 hours) at 2/21/2023 1413  Last data filed at 2/21/2023 1401  Gross per 24 hour   Intake 1790 ml   Output 1100 ml   Net 690 ml     CBC:   Recent Labs     02/19/23  1328 02/20/23  0440 02/21/23  0235   WBC 8.1 7.7 9.3   HGB 12.4* 11.7* 11.3*    176 177     BMP:  Recent Labs     02/19/23  1328 02/20/23  0440 02/21/23  0235   * 135* 137   K 3.8 4.2 4.0    102 102   CO2 20* 20* 21*   BUN 40* 34* 33*   CREATININE 2.5* 2.3* 2.4*   GLUCOSE 185* 162* 178*     ABGs: No results found for: PHART, PO2ART, COH7FNP  INR:   Recent Labs     02/20/23  0440   INR 1.09         Objective:   Vitals: BP (!) 140/64   Pulse 78   Temp 97.7 °F (36.5 °C) (Axillary)   Resp 18   Ht 6' 2\" (1.88 m)   Wt 240 lb 1.6 oz (108.9 kg)   SpO2 94%   BMI 30.83 kg/m²   General appearance: alert, appears stated age and cooperative  Skin: Skin color, texture, turgor normal.   HEENT: Head: Normocephalic, no lesions, without obvious abnormality.   Neck: no adenopathy, no carotid bruit, no JVD, and supple, symmetrical, trachea midline  Lungs: clear to auscultation bilaterally  Heart: regular rate and rhythm, S1, S2 normal, no murmur, click, rub or gallop  Abdomen: soft, non-tender; bowel sounds normal; no masses,  no organomegaly  Extremities: extremities normal, atraumatic, no cyanosis or edema  Lymphatic: No significant lymph node enlargement papable  Neurologic: Mental status: Alert, oriented, thought content appropriate        Assessment & Plan:    Acute low back pain/flank pain- work up neg so far- cont IV ab and IV pain control  Elevated inflammatory markers   Anemia of iron def- venofer  HTN  DM2  HL  BPH  Metabolic acidosis- bicarb        See orders   Disposition: home when better     Tamar Galarza MD

## 2023-02-21 NOTE — PLAN OF CARE
Problem: Discharge Planning  Goal: Discharge to home or other facility with appropriate resources  2/21/2023 0056 by Heri Noel RN  Outcome: Progressing  Flowsheets (Taken 2/20/2023 2130)  Discharge to home or other facility with appropriate resources:   Identify barriers to discharge with patient and caregiver   Arrange for needed discharge resources and transportation as appropriate  2/20/2023 1130 by Oliver Galvez RN  Outcome: Not Progressing  Flowsheets  Taken 2/20/2023 1130  Discharge to home or other facility with appropriate resources:   Identify barriers to discharge with patient and caregiver   Identify discharge learning needs (meds, wound care, etc)   Refer to discharge planning if patient needs post-hospital services based on physician order or complex needs related to functional status, cognitive ability or social support system   Arrange for interpreters to assist at discharge as needed   Arrange for needed discharge resources and transportation as appropriate  Taken 2/20/2023 0923  Discharge to home or other facility with appropriate resources:   Identify barriers to discharge with patient and caregiver   Arrange for needed discharge resources and transportation as appropriate   Identify discharge learning needs (meds, wound care, etc)   Arrange for interpreters to assist at discharge as needed   Refer to discharge planning if patient needs post-hospital services based on physician order or complex needs related to functional status, cognitive ability or social support system     Problem: Pain  Goal: Verbalizes/displays adequate comfort level or baseline comfort level  2/21/2023 0056 by Heri Noel RN  Outcome: Progressing  2/20/2023 1130 by Oliver Galvez RN  Outcome: Not Progressing     Problem: Safety - Adult  Goal: Free from fall injury  2/21/2023 0056 by Heri Noel RN  Outcome: Progressing  2/20/2023 1130 by Oliver Galvez RN  Outcome: Not Progressing Problem: ABCDS Injury Assessment  Goal: Absence of physical injury  2/21/2023 0056 by Nguyễn Helms RN  Outcome: Progressing  2/20/2023 1130 by Bayron Klein RN  Outcome: Not Progressing     Problem: Discharge Planning  Goal: Discharge to home or other facility with appropriate resources  2/21/2023 0056 by Nguyễn Helms RN  Outcome: Progressing  Flowsheets (Taken 2/20/2023 2130)  Discharge to home or other facility with appropriate resources:   Identify barriers to discharge with patient and caregiver   Arrange for needed discharge resources and transportation as appropriate  2/20/2023 1130 by Bayron Klein RN  Outcome: Not Progressing  Flowsheets  Taken 2/20/2023 1130  Discharge to home or other facility with appropriate resources:   Identify barriers to discharge with patient and caregiver   Identify discharge learning needs (meds, wound care, etc)   Refer to discharge planning if patient needs post-hospital services based on physician order or complex needs related to functional status, cognitive ability or social support system   Arrange for interpreters to assist at discharge as needed   Arrange for needed discharge resources and transportation as appropriate  Taken 2/20/2023 0923  Discharge to home or other facility with appropriate resources:   Identify barriers to discharge with patient and caregiver   Arrange for needed discharge resources and transportation as appropriate   Identify discharge learning needs (meds, wound care, etc)   Arrange for interpreters to assist at discharge as needed   Refer to discharge planning if patient needs post-hospital services based on physician order or complex needs related to functional status, cognitive ability or social support system     Problem: Pain  Goal: Verbalizes/displays adequate comfort level or baseline comfort level  2/21/2023 0056 by Nguyễn Helms RN  Outcome: Progressing  2/20/2023 1130 by Bayron Klein RN  Outcome: Not Progressing     Problem: Safety - Adult  Goal: Free from fall injury  2/21/2023 0056 by Venkatesh Blancas RN  Outcome: Progressing  2/20/2023 1130 by Beryle Schneider, RN  Outcome: Not Progressing     Problem: ABCDS Injury Assessment  Goal: Absence of physical injury  2/21/2023 0056 by Venkatesh Blancas RN  Outcome: Progressing  2/20/2023 1130 by Beryle Schneider, RN  Outcome: Not Progressing

## 2023-02-21 NOTE — PROGRESS NOTES
[address] [facility] Pharmacokinetic Monitoring Service - Vancomycin    Consulting Provider: Dr Adrián Michael   Indication: Skin/Soft Tissue Infection  Target Concentration: Dosing based on anticipated concentration <15 mg/L due to renal impairment/insufficiency  Day of Therapy: 2  Additional Antimicrobials: Merrem    Pertinent Laboratory Values: Wt Readings from Last 1 Encounters:   02/21/23 240 lb 1.6 oz (108.9 kg)     Temp Readings from Last 1 Encounters:   02/20/23 99 °F (37.2 °C) (Oral)     Estimated Creatinine Clearance: 37 mL/min (A) (based on SCr of 2.4 mg/dL (H)). Recent Labs     02/20/23  0440 02/21/23  0235   CREATININE 2.3* 2.4*   WBC 7.7 9.3     Procalcitonin: N/A    Pertinent Cultures:  No current cultures at this time  Culture Date Source Results        MRSA Nasal Swab: N/A. Non-respiratory infection.     Recent vancomycin administrations                     vancomycin (VANCOCIN) 2,500 mg in sodium chloride 0.9 % 500 mL IVPB (mg) 2,500 mg New Bag 02/20/23 0615                    Assessment:  Date/Time Current Dose Concentration Timing of Concentration (h) AUC   02/21/23 Pulse dosing 15.7 Random level    Note: Serum concentrations collected for AUC dosing may appear elevated if collected in close proximity to the dose administered, this is not necessarily an indication of toxicity    Plan:  Current dosing regimen is therapeutic  Give Vancomycin 1250mg x1 dose today, with random level tomorrow morning  Repeat vancomycin concentration ordered for 02/22 @ 0300   Pharmacy will continue to monitor patient and adjust therapy as indicated    Thank you for the consult,  DIXON Geiger NAVJOT [facility]  2/21/2023 3:39 AM

## 2023-02-21 NOTE — PROGRESS NOTES
Physical Therapy   Name: Estelle Armstrong . MRN:  [id number]  Date of service:  2/21/2023  Pt. willing to work with PT initially and then was very drowsy after receiving pain meds and falling asleep when trying to answer history questions. Will f/u at a later time.   Electronically signed by Bernardino Singh PT on 2/21/2023 at 12:28 PM

## 2023-02-22 PROBLEM — M54.59 INTRACTABLE LOW BACK PAIN: Status: ACTIVE | Noted: 2023-02-22

## 2023-02-22 PROBLEM — R10.9 FLANK PAIN: Status: RESOLVED | Noted: 2023-02-21 | Resolved: 2023-02-22

## 2023-02-22 LAB
ANION GAP SERPL CALCULATED.3IONS-SCNC: 14 MMOL/L (ref 7–19)
BASOPHILS ABSOLUTE: 0 K/UL (ref 0–0.2)
BASOPHILS RELATIVE PERCENT: 0.1 % (ref 0–1)
BUN BLDV-MCNC: 38 MG/DL (ref 8–23)
CALCIUM SERPL-MCNC: 9.3 MG/DL (ref 8.8–10.2)
CHLORIDE BLD-SCNC: 104 MMOL/L (ref 98–111)
CO2: 19 MMOL/L (ref 22–29)
CREAT SERPL-MCNC: 2.6 MG/DL (ref 0.5–1.2)
EOSINOPHILS ABSOLUTE: 0 K/UL (ref 0–0.6)
EOSINOPHILS RELATIVE PERCENT: 0 % (ref 0–5)
GFR SERPL CREATININE-BSD FRML MDRD: 25 ML/MIN/{1.73_M2}
GLUCOSE BLD-MCNC: 184 MG/DL (ref 70–99)
GLUCOSE BLD-MCNC: 186 MG/DL (ref 70–99)
GLUCOSE BLD-MCNC: 197 MG/DL (ref 74–109)
GLUCOSE BLD-MCNC: 265 MG/DL (ref 70–99)
GLUCOSE BLD-MCNC: 307 MG/DL (ref 70–99)
HCT VFR BLD CALC: 32 % (ref 42–52)
HEMOGLOBIN: 11.2 G/DL (ref 14–18)
IMMATURE GRANULOCYTES #: 0.1 K/UL
LYMPHOCYTES ABSOLUTE: 1.1 K/UL (ref 1.1–4.5)
LYMPHOCYTES RELATIVE PERCENT: 14.6 % (ref 20–40)
MCH RBC QN AUTO: 30.7 PG (ref 27–31)
MCHC RBC AUTO-ENTMCNC: 35 G/DL (ref 33–37)
MCV RBC AUTO: 87.7 FL (ref 80–94)
MONOCYTES ABSOLUTE: 0.1 K/UL (ref 0–0.9)
MONOCYTES RELATIVE PERCENT: 1.8 % (ref 0–10)
NEUTROPHILS ABSOLUTE: 6.1 K/UL (ref 1.5–7.5)
NEUTROPHILS RELATIVE PERCENT: 82.8 % (ref 50–65)
PDW BLD-RTO: 13.1 % (ref 11.5–14.5)
PERFORMED ON: ABNORMAL
PLATELET # BLD: 201 K/UL (ref 130–400)
PMV BLD AUTO: 10 FL (ref 9.4–12.4)
POTASSIUM REFLEX MAGNESIUM: 4.3 MMOL/L (ref 3.5–5)
RBC # BLD: 3.65 M/UL (ref 4.7–6.1)
SODIUM BLD-SCNC: 137 MMOL/L (ref 136–145)
VANCOMYCIN TROUGH: 17.9 UG/ML (ref 10–20)
WBC # BLD: 7.3 K/UL (ref 4.8–10.8)

## 2023-02-22 PROCEDURE — 6370000000 HC RX 637 (ALT 250 FOR IP): Performed by: HOSPITALIST

## 2023-02-22 PROCEDURE — 97165 OT EVAL LOW COMPLEX 30 MIN: CPT

## 2023-02-22 PROCEDURE — 97530 THERAPEUTIC ACTIVITIES: CPT

## 2023-02-22 PROCEDURE — 80202 ASSAY OF VANCOMYCIN: CPT

## 2023-02-22 PROCEDURE — 2580000003 HC RX 258: Performed by: HOSPITALIST

## 2023-02-22 PROCEDURE — 6370000000 HC RX 637 (ALT 250 FOR IP): Performed by: STUDENT IN AN ORGANIZED HEALTH CARE EDUCATION/TRAINING PROGRAM

## 2023-02-22 PROCEDURE — 80048 BASIC METABOLIC PNL TOTAL CA: CPT

## 2023-02-22 PROCEDURE — 6360000002 HC RX W HCPCS: Performed by: HOSPITALIST

## 2023-02-22 PROCEDURE — 97162 PT EVAL MOD COMPLEX 30 MIN: CPT

## 2023-02-22 PROCEDURE — 82962 GLUCOSE BLOOD TEST: CPT

## 2023-02-22 PROCEDURE — 1210000000 HC MED SURG R&B

## 2023-02-22 PROCEDURE — 94760 N-INVAS EAR/PLS OXIMETRY 1: CPT

## 2023-02-22 PROCEDURE — 97116 GAIT TRAINING THERAPY: CPT

## 2023-02-22 PROCEDURE — 36415 COLL VENOUS BLD VENIPUNCTURE: CPT

## 2023-02-22 PROCEDURE — 85025 COMPLETE CBC W/AUTO DIFF WBC: CPT

## 2023-02-22 RX ORDER — TIZANIDINE 4 MG/1
4 TABLET ORAL 3 TIMES DAILY
Status: DISCONTINUED | OUTPATIENT
Start: 2023-02-22 | End: 2023-02-23 | Stop reason: HOSPADM

## 2023-02-22 RX ORDER — SENNA AND DOCUSATE SODIUM 50; 8.6 MG/1; MG/1
2 TABLET, FILM COATED ORAL DAILY
Status: DISCONTINUED | OUTPATIENT
Start: 2023-02-22 | End: 2023-02-23 | Stop reason: HOSPADM

## 2023-02-22 RX ORDER — GABAPENTIN 300 MG/1
300 CAPSULE ORAL 3 TIMES DAILY
Status: DISCONTINUED | OUTPATIENT
Start: 2023-02-22 | End: 2023-02-22

## 2023-02-22 RX ORDER — GABAPENTIN 400 MG/1
400 CAPSULE ORAL 3 TIMES DAILY
Status: DISCONTINUED | OUTPATIENT
Start: 2023-02-22 | End: 2023-02-22

## 2023-02-22 RX ORDER — HYDROCODONE BITARTRATE AND ACETAMINOPHEN 7.5; 325 MG/1; MG/1
1 TABLET ORAL EVERY 6 HOURS PRN
Status: DISCONTINUED | OUTPATIENT
Start: 2023-02-22 | End: 2023-02-23 | Stop reason: HOSPADM

## 2023-02-22 RX ORDER — PREDNISONE 20 MG/1
20 TABLET ORAL DAILY
Status: DISCONTINUED | OUTPATIENT
Start: 2023-02-22 | End: 2023-02-23 | Stop reason: HOSPADM

## 2023-02-22 RX ORDER — GABAPENTIN 300 MG/1
300 CAPSULE ORAL 3 TIMES DAILY
Status: DISCONTINUED | OUTPATIENT
Start: 2023-02-22 | End: 2023-02-23 | Stop reason: HOSPADM

## 2023-02-22 RX ADMIN — Medication 1 TABLET: at 08:15

## 2023-02-22 RX ADMIN — ENOXAPARIN SODIUM 30 MG: 100 INJECTION SUBCUTANEOUS at 08:15

## 2023-02-22 RX ADMIN — ASPIRIN 81 MG: 81 TABLET, COATED ORAL at 08:15

## 2023-02-22 RX ADMIN — SODIUM BICARBONATE 650 MG: 650 TABLET ORAL at 17:26

## 2023-02-22 RX ADMIN — CLOPIDOGREL BISULFATE 75 MG: 75 TABLET ORAL at 08:15

## 2023-02-22 RX ADMIN — DOXAZOSIN 4 MG: 2 TABLET ORAL at 08:15

## 2023-02-22 RX ADMIN — PANTOPRAZOLE SODIUM 20 MG: 20 TABLET, DELAYED RELEASE ORAL at 05:16

## 2023-02-22 RX ADMIN — ORPHENADRINE CITRATE 60 MG: 30 INJECTION INTRAMUSCULAR; INTRAVENOUS at 05:16

## 2023-02-22 RX ADMIN — AMLODIPINE BESYLATE 10 MG: 5 TABLET ORAL at 08:15

## 2023-02-22 RX ADMIN — FINASTERIDE 5 MG: 5 TABLET, FILM COATED ORAL at 08:15

## 2023-02-22 RX ADMIN — SODIUM BICARBONATE 650 MG: 650 TABLET ORAL at 14:16

## 2023-02-22 RX ADMIN — INSULIN GLARGINE 35 UNITS: 100 INJECTION, SOLUTION SUBCUTANEOUS at 21:28

## 2023-02-22 RX ADMIN — Medication 5 MG: at 20:22

## 2023-02-22 RX ADMIN — GABAPENTIN 300 MG: 300 CAPSULE ORAL at 14:00

## 2023-02-22 RX ADMIN — POLYETHYLENE GLYCOL 3350 17 G: 17 POWDER, FOR SOLUTION ORAL at 17:26

## 2023-02-22 RX ADMIN — TIZANIDINE 4 MG: 4 TABLET ORAL at 20:21

## 2023-02-22 RX ADMIN — GABAPENTIN 300 MG: 300 CAPSULE ORAL at 08:15

## 2023-02-22 RX ADMIN — EMPAGLIFLOZIN 25 MG: 25 TABLET, FILM COATED ORAL at 08:15

## 2023-02-22 RX ADMIN — SODIUM BICARBONATE 650 MG: 650 TABLET ORAL at 08:15

## 2023-02-22 RX ADMIN — ROPINIROLE HYDROCHLORIDE 2 MG: 2 TABLET, FILM COATED ORAL at 08:15

## 2023-02-22 RX ADMIN — PREDNISONE 20 MG: 20 TABLET ORAL at 16:28

## 2023-02-22 RX ADMIN — EZETIMIBE 10 MG: 10 TABLET ORAL at 08:15

## 2023-02-22 RX ADMIN — INSULIN GLARGINE 35 UNITS: 100 INJECTION, SOLUTION SUBCUTANEOUS at 08:16

## 2023-02-22 RX ADMIN — DULOXETINE 20 MG: 20 CAPSULE, DELAYED RELEASE ORAL at 08:15

## 2023-02-22 RX ADMIN — ROPINIROLE HYDROCHLORIDE 2 MG: 2 TABLET, FILM COATED ORAL at 20:22

## 2023-02-22 RX ADMIN — TIZANIDINE 4 MG: 4 TABLET ORAL at 16:28

## 2023-02-22 RX ADMIN — HYDROCODONE BITARTRATE AND ACETAMINOPHEN 1 TABLET: 7.5; 325 TABLET ORAL at 20:22

## 2023-02-22 RX ADMIN — VANCOMYCIN HYDROCHLORIDE 1000 MG: 10 INJECTION, POWDER, LYOPHILIZED, FOR SOLUTION INTRAVENOUS at 05:15

## 2023-02-22 RX ADMIN — SENNOSIDES AND DOCUSATE SODIUM 2 TABLET: 50; 8.6 TABLET ORAL at 09:16

## 2023-02-22 RX ADMIN — ROSUVASTATIN CALCIUM 10 MG: 10 TABLET, FILM COATED ORAL at 08:15

## 2023-02-22 RX ADMIN — INSULIN LISPRO 6 UNITS: 100 INJECTION, SOLUTION INTRAVENOUS; SUBCUTANEOUS at 11:23

## 2023-02-22 RX ADMIN — HYDROMORPHONE HYDROCHLORIDE 1 MG: 1 INJECTION, SOLUTION INTRAMUSCULAR; INTRAVENOUS; SUBCUTANEOUS at 02:57

## 2023-02-22 RX ADMIN — SODIUM CHLORIDE, PRESERVATIVE FREE 10 ML: 5 INJECTION INTRAVENOUS at 20:22

## 2023-02-22 RX ADMIN — TIZANIDINE 4 MG: 4 TABLET ORAL at 09:23

## 2023-02-22 RX ADMIN — HYDROCODONE BITARTRATE AND ACETAMINOPHEN 1 TABLET: 7.5; 325 TABLET ORAL at 08:15

## 2023-02-22 RX ADMIN — GABAPENTIN 300 MG: 300 CAPSULE ORAL at 20:21

## 2023-02-22 RX ADMIN — IRON SUCROSE 200 MG: 20 INJECTION, SOLUTION INTRAVENOUS at 14:20

## 2023-02-22 RX ADMIN — ENOXAPARIN SODIUM 30 MG: 100 INJECTION SUBCUTANEOUS at 20:22

## 2023-02-22 RX ADMIN — SODIUM BICARBONATE 650 MG: 650 TABLET ORAL at 20:21

## 2023-02-22 RX ADMIN — LOSARTAN POTASSIUM 100 MG: 100 TABLET, FILM COATED ORAL at 08:15

## 2023-02-22 RX ADMIN — HYDROCODONE BITARTRATE AND ACETAMINOPHEN 1 TABLET: 7.5; 325 TABLET ORAL at 14:16

## 2023-02-22 RX ADMIN — HYDROCHLOROTHIAZIDE 25 MG: 25 TABLET ORAL at 08:15

## 2023-02-22 RX ADMIN — Medication 2000 UNITS: at 08:15

## 2023-02-22 ASSESSMENT — ENCOUNTER SYMPTOMS
NAUSEA: 0
VOMITING: 0
COUGH: 0
ABDOMINAL PAIN: 0
SHORTNESS OF BREATH: 0

## 2023-02-22 ASSESSMENT — PAIN SCALES - GENERAL
PAINLEVEL_OUTOF10: 6
PAINLEVEL_OUTOF10: 6
PAINLEVEL_OUTOF10: 8
PAINLEVEL_OUTOF10: 6
PAINLEVEL_OUTOF10: 4
PAINLEVEL_OUTOF10: 2
PAINLEVEL_OUTOF10: 6
PAINLEVEL_OUTOF10: 7
PAINLEVEL_OUTOF10: 9
PAINLEVEL_OUTOF10: 8

## 2023-02-22 ASSESSMENT — PAIN DESCRIPTION - DESCRIPTORS
DESCRIPTORS: ACHING;CRAMPING
DESCRIPTORS: TIGHTNESS;THROBBING
DESCRIPTORS: ACHING
DESCRIPTORS: ACHING;CRAMPING
DESCRIPTORS: ACHING

## 2023-02-22 ASSESSMENT — PAIN - FUNCTIONAL ASSESSMENT
PAIN_FUNCTIONAL_ASSESSMENT: ACTIVITIES ARE NOT PREVENTED
PAIN_FUNCTIONAL_ASSESSMENT: PREVENTS OR INTERFERES WITH ALL ACTIVE AND SOME PASSIVE ACTIVITIES

## 2023-02-22 ASSESSMENT — PAIN DESCRIPTION - ORIENTATION
ORIENTATION: RIGHT;LEFT;MID
ORIENTATION: RIGHT;LEFT;MID
ORIENTATION: RIGHT;MID
ORIENTATION: UPPER;LOWER
ORIENTATION: UPPER
ORIENTATION: UPPER

## 2023-02-22 ASSESSMENT — PAIN DESCRIPTION - LOCATION
LOCATION: BACK
LOCATION: ABDOMEN;BACK
LOCATION: BACK

## 2023-02-22 NOTE — PROGRESS NOTES
Occupational Therapy  Facility/Department: [facility] RINA/VAS/MED  Occupational Therapy Initial Assessment    Name: Joan Ruelas Sr.  : 1950  MRN: [id number]  Date of Service: 2023    Discharge Recommendations:  Home with assist PRN  OT Equipment Recommendations  Equipment Needed: Yes  Other: tub transfer bench, toilet riser       Patient Diagnosis(es): The encounter diagnosis was Intractable back pain. Past Medical History:  has a past medical history of CAD (coronary artery disease), Class 1 obesity, Colon polyps, Diabetes mellitus ([facility] [address] Hyperlipidemia, Hypertension, Kidney stone, Psoriatic arthritis ([facility] [address] and Stage 3b chronic kidney disease (CKD) ([facility] [address] GFR 30 for YAHIR at [facility] on . Past Surgical History:  has a past surgical history that includes fracture surgery (Right, ); tumor removal (); back surgery (); Cataract extraction w/ intraocular lens implant (Bilateral, ); and Dental surgery (2023). Assessment   Performance deficits / Impairments: Decreased ADL status; Decreased functional mobility ; Decreased endurance  Assessment: OT eval only. Pt demos decreased LB reach for ADL but declines use of AE to increase independence. UB ADL - setup. CGA functional mobility with RW. At pt's current functional status, a tub transfer bench is recommended and toilet riser to increase safety/independence in functional transfers.   REQUIRES OT FOLLOW-UP: No  Activity Tolerance  Activity Tolerance: Patient Tolerated treatment well        Plan         Restrictions       Subjective         Social/Functional History  Social/Functional History  Lives With: Spouse  Type of Home: Apartment  Home Layout: One level  Home Access: Stairs to enter with rails  Entrance Stairs - Number of Steps: 6 LUCERO  Bathroom Shower/Tub: Tub/Shower unit  Bathroom Toilet: Standard  Home Equipment: Cane (in AL)  ADL Assistance: Independent  Ambulation Assistance: Independent  Transfer Assistance: Independent  Active : Yes  Occupation: Part time employment  Type of Occupation:        Objective   Heart Rate: 64  Heart Rate Source: Monitor  BP: (!) 147/72  BP Location: Left upper arm  MAP (Calculated): 97  Resp: 18  SpO2: 93 %  O2 Device: None (Room air)             Safety Devices  Type of Devices: Gait belt;Left in bed;Call light within reach; Bed alarm in place     Toilet Transfers  Toilet - Technique: Ambulating (RW, CGA)  Equipment Used: Raised toilet seat without rails  Toilet Transfer: Contact guard assistance (per clinical observation of prerequisite skills)     ADL  Feeding: Independent  Grooming: Independent;Setup  UE Bathing: Independent;Setup  LE Bathing: Moderate assistance  UE Dressing: Setup  LE Dressing:  Moderate assistance  Toileting: Minimal assistance  Additional Comments: ADL per clinical observation of prerequisite skills/task simulation        Bed mobility  Supine to Sit: Stand by assistance (HOB elevated)  Sit to Supine: Stand by assistance  Transfers  Sit to stand: Contact guard assistance  Stand to sit: Contact guard assistance  Vision  Vision: Impaired  Vision Exceptions: Wears glasses for reading  Hearing  Hearing: Within functional limits  Cognition  Overall Cognitive Status: WFL  Orientation  Overall Orientation Status: Within Functional Limits                     LUE AROM (degrees)  LUE AROM : WFL  RUE AROM (degrees)  RUE AROM : WFL                       Goals  Eval only          Jose R Jay OT  Electronically signed by Jose R Jay OT on 2/22/2023 at 11:33 AM

## 2023-02-22 NOTE — CARE COORDINATION
02/22/23 1700   Service Assessment   Patient Orientation Alert and Oriented   Cognition Alert   History Provided By Patient;Medical Record   Primary Caregiver Self   Accompanied By/Relationship spouse at bedside   Support Systems Spouse/Significant Other;Family Members;Friends/Neighbors   Patient's Healthcare Decision Maker is: Legal Next of Judd 70   PCP Verified by CM Yes  (Pt has [facility] doc and PCP; in [place]; he only [place] here)   Last Visit to PCP Within last 6 months   Prior Functional Level Independent in ADLs/IADLs   Current Functional Level Independent in ADLs/IADLs   Can patient return to prior living arrangement Yes   Ability to make needs known: Good   Family able to assist with home care needs: Yes   Financial Resources Medicare;[place] ([facility])   Community Resources None   Social/Functional History   Lives With Spouse   Type of [address] [facility] One level   [facility] [address] to enter with rails   Bathroom Shower/Tub Tub/Shower unit   [address] Help From Family;Friend(s)   ADL Assistance Independent   Homemaking Assistance Independent   Ambulation Assistance Independent   Transfer Assistance Independent   Active  Yes   Occupation Part time employment   Discharge Planning   Type of Residence Apartment   Living Arrangements Alone  (working here; lives in [place]; wife is here presently)   Current Services Prior To Admission None   Potential Assistance Needed Durable Medical Equipment   Potential DME Needed [address] Medications No   Type of [address] None   Patient expects to be discharged to: Apartment   History of falls?  0   Services At/After Discharge   Transition of Care Consult (CM Consult) Other  (Pt would like an HEP from therapy to do at home instead of outpt PT or HH)   Mode of Transport at Discharge Other (see comment)  (spouse)   Confirm Follow Up Transport Self   Electronically signed by MARISA Olivares on 2/22/2023 at 5:49 PM

## 2023-02-22 NOTE — PLAN OF CARE
Problem: Discharge Planning  Goal: Discharge to home or other facility with appropriate resources  2/22/2023 0146 by Rhonda Palacios RN  Outcome: Progressing  Flowsheets (Taken 2/21/2023 1930)  Discharge to home or other facility with appropriate resources: Identify barriers to discharge with patient and caregiver  2/21/2023 1321 by Tamy Magana RN  Outcome: Progressing     Problem: Pain  Goal: Verbalizes/displays adequate comfort level or baseline comfort level  2/22/2023 0146 by Rhonda Palacios RN  Outcome: Progressing  2/21/2023 1321 by Tamy Magana RN  Outcome: Progressing     Problem: Safety - Adult  Goal: Free from fall injury  2/22/2023 0146 by Rhonda Palacios RN  Outcome: Progressing  2/21/2023 1321 by Tamy Magana RN  Outcome: Progressing     Problem: ABCDS Injury Assessment  Goal: Absence of physical injury  2/22/2023 0146 by Rhonda Palacios RN  Outcome: Progressing  2/21/2023 1321 by Tamy Magana RN  Outcome: Progressing     Problem: Chronic Conditions and Co-morbidities  Goal: Patient's chronic conditions and co-morbidity symptoms are monitored and maintained or improved  Outcome: Progressing

## 2023-02-22 NOTE — PROGRESS NOTES
[address] [facility] Pharmacokinetic Monitoring Service - Vancomycin    Consulting Provider: Dr Veena Nesbitt   Indication: Skin/Soft Tissue Infection  Target Concentration: Dosing based on anticipated concentration <15 mg/L due to renal impairment/insufficiency  Day of Therapy: 3  Additional Antimicrobials: None    Pertinent Laboratory Values: Wt Readings from Last 1 Encounters:   02/21/23 240 lb 1.6 oz (108.9 kg)     Temp Readings from Last 1 Encounters:   02/22/23 98.4 °F (36.9 °C) (Oral)     Estimated Creatinine Clearance: 34 mL/min (A) (based on SCr of 2.6 mg/dL (H)). Recent Labs     02/21/23  0235 02/22/23  0256   CREATININE 2.4* 2.6*   WBC 9.3 7.3     Procalcitonin: 02/20/23    Pertinent Cultures:  Culture Date Source Results   Blood  No growth Blood   MRSA Nasal Swab: N/A. Non-respiratory infection.     Recent vancomycin administrations                     vancomycin (VANCOCIN) 1,250 mg in sodium chloride 0.9 % 250 mL IVPB (mg) 1,250 mg New Bag 02/21/23 0543    vancomycin (VANCOCIN) 2,500 mg in sodium chloride 0.9 % 500 mL IVPB (mg) 2,500 mg New Bag 02/20/23 0615                    Assessment:  Date/Time Current Dose Concentration Timing of Concentration (h) AUC   02/22/23 Pulse dosing 17.9 Random level    Note: Serum concentrations collected for AUC dosing may appear elevated if collected in close proximity to the dose administered, this is not necessarily an indication of toxicity    Plan:  Current dosing regimen is therapeutic  Give Vancomycin 1000mg x1 dose, with random level tomorrow morning  Repeat vancomycin concentration ordered for 02/23 @ [address] [facility] will continue to monitor patient and adjust therapy as indicated    Thank you for the consult,  Bill Lee [facility]  2/22/2023 4:04 AM

## 2023-02-22 NOTE — PROGRESS NOTES
Physical Therapy  Facility/Department: [facility] RINA/VAS/MED  Physical Therapy Initial Assessment    Name: Elida Hoff Sr.  : 1950  MRN: [id number]  Date of Service: 2023    Discharge Recommendations:  Continue to assess pending progress, Home with assist PRN, Patient would benefit from continued therapy after discharge          Patient Diagnosis(es): The encounter diagnosis was Intractable back pain. Past Medical History:  has a past medical history of CAD (coronary artery disease), Class 1 obesity, Colon polyps, Diabetes mellitus ([facility] [address] Hyperlipidemia, Hypertension, Kidney stone, Psoriatic arthritis ([facility] [address] and Stage 3b chronic kidney disease (CKD) ([facility] [address] GFR 30 for YAHIR at [facility] on . Past Surgical History:  has a past surgical history that includes fracture surgery (Right, ); tumor removal (); back surgery (); Cataract extraction w/ intraocular lens implant (Bilateral, ); and Dental surgery (2023). Assessment   Body Structures, Functions, Activity Limitations Requiring Skilled Therapeutic Intervention: Decreased functional mobility ; Decreased strength;Decreased balance; Increased pain;Decreased ADL status; Decreased endurance  Assessment: Pt. will benefit from cont. PT to decrease impairments. Pt. a fall risk and should not attempt mobility on his own due to neuropathy, back pain and some dizziness with standing. Pt. encouraged to sit up during the day. Pt. would be safe to d/c home with wife's A. He was able to amb. 200'+ in hallway with only putting light pressure on RW. Pt. asked about using a back brace, but informed that it likely wouldn't help with the pain. He was encouraged to use spinal precautions of no bending, lifting, and twisting to allow his back to heal. He was also educated on the log roll. He may be more comfortable using his own shoes for amb. in hallway vs hospital socks due to neuropathy.   Treatment Diagnosis: impaired gait and mobility  Therapy Prognosis: Fair  Decision Making: Medium Complexity  Barriers to Learning: none  Requires PT Follow-Up: Yes  Activity Tolerance  Activity Tolerance: Patient tolerated treatment well     Plan   Physcial Therapy Plan  General Plan: 6-7 times per week  Current Treatment Recommendations: Functional mobility training, Gait training, Endurance training, Patient/Caregiver education & training, Equipment evaluation, education, & procurement, Therapeutic activities, Positioning  Additional Comments: log roll  Safety Devices  Type of Devices: Gait belt, Left in bed, Call light within reach, Bed alarm in place, Patient at risk for falls, Nurse notified (charge RN notified)     Restrictions  Restrictions/Precautions  Restrictions/Precautions: Fall Risk  Required Braces or Orthoses?: No     Subjective   Pain: 8/10 in back R side post gait (charge RN, Joseline Lobato made aware)  General  Chart Reviewed: Yes  Patient assessed for rehabilitation services?: Yes  Response To Previous Treatment: Not applicable  Family / Caregiver Present: Yes  Referring Practitioner: Lavonne Hoang MD, Nemesio Lu MD  Referral Date : 02/20/23 (2/21/23 Dr. Prince Butt)  Diagnosis: Suspected soft tissue infection, flank pain  Follows Commands: Within Functional Limits  General Comment  Comments: RNKaz, unavailable at time of eval, but Joseline LORA notified pt wanting to check on pain meds. Subjective  Subjective: States he has been up with RW.          Social/Functional History  Social/Functional History  Lives With: Spouse  Type of Home: Apartment  Home Layout: One level  Home Access: Stairs to enter with rails  Entrance Stairs - Number of Steps: 6 [address]UC[address]  Bathroom Shower/Tub: Tub/Shower unit  Bathroom Toilet: Standard  Home Equipment: Cane (in [place])  ADL Assistance: Independent  Ambulation Assistance: Independent  Transfer Assistance: Independent  Active : Yes  Occupation: Part time employment  Type of Occupation:   Additional Comments: pt states he plans on staying in [facility] [place] [place] before returning to [address] [place] [address]: Impaired  Vision Exceptions: Wears glasses for reading  Hearing  Hearing: Within functional limits    Cognition   Orientation  Overall Orientation Status: Within Functional Limits  Cognition  Overall Cognitive Status: WFL     Objective   Heart Rate: 64  Heart Rate Source: Monitor  BP: (!) 147/72  BP Location: Left upper arm  MAP (Calculated): 97  Resp: 18  SpO2: 93 %  O2 Device: None (Room air)     Observation/Palpation  Posture: Good  Observation: telemetry  Gross Assessment  Sensation: Impaired (neuropathy B feet and R thigh numbness)     AROM RLE (degrees)  RLE AROM: WFL  AROM LLE (degrees)  LLE AROM : WFL  Strength RLE  Strength RLE: WFL  Comment: at least 3+/5  Strength LLE  Strength LLE: WFL  Comment: at least 3+/5           Bed mobility  Supine to Sit: Stand by assistance (HOB elevated and requested it stay there)  Sit to Supine: Stand by assistance  Bed Mobility Comments: pt instructed on log roll  Transfers  Sit to Stand: Stand by assistance  Stand to Sit: Stand by assistance  Ambulation  Surface: Level tile  Device: Rolling Walker  Assistance: Contact guard assistance  Quality of Gait: a little unsteady at times, but pt has neuropathy and mentions that during amb., combined with sensation of grippers on hospital socks.   Gait Deviations: Slow Rebeca;Decreased step length;Decreased step height  Distance: 200'+  Comments: felt a little dizzy initially, but improved with distance     Balance  Posture: Fair  Sitting - Static: +;Good  Sitting - Dynamic: Good;+  Standing - Static: Fair;+  Standing - Dynamic: Fair;-           OutComes Score                                                  AM-PAC Score             Tinneti Score       Goals  Short Term Goals  Time Frame for Short Term Goals: 2 wks  Short Term Goal 1: supine to sit indep  Short Term Goal 2: sit to stand indep  Short Term Goal 3: amb. 300' indep with or without RW  Patient Goals   Patient Goals : go home       Education  Patient Education  Education Given To: Patient; Family  Education Provided: Role of Therapy;Plan of Care;Transfer Training  Education Provided Comments: use of call light, staff A  Education Method: Verbal  Barriers to Learning: None  Education Outcome: Verbalized understanding;Continued education needed      Therapy Time   Individual Concurrent Group Co-treatment   Time In           Time Out           Minutes                   Nelson Theodore PT     Electronically signed by Nelson Theodore PT on 2/22/2023 at 12:16 PM

## 2023-02-22 NOTE — PLAN OF CARE
Problem: Discharge Planning  Goal: Discharge to home or other facility with appropriate resources  2/22/2023 1140 by Alis Palencia RN  Outcome: Progressing  2/22/2023 0146 by Rashid Zapata RN  Outcome: Progressing  Flowsheets (Taken 2/21/2023 1930)  Discharge to home or other facility with appropriate resources: Identify barriers to discharge with patient and caregiver     Problem: Pain  Goal: Verbalizes/displays adequate comfort level or baseline comfort level  2/22/2023 1140 by Alis Palencia RN  Outcome: Progressing  2/22/2023 0146 by Rashid Zapata RN  Outcome: Progressing     Problem: Safety - Adult  Goal: Free from fall injury  2/22/2023 1140 by Alis Palencia RN  Outcome: Progressing  2/22/2023 0146 by Rashid Zapata RN  Outcome: Progressing     Problem: ABCDS Injury Assessment  Goal: Absence of physical injury  2/22/2023 1140 by Alis Palencia RN  Outcome: Progressing  2/22/2023 0146 by Rashid Zapata RN  Outcome: Progressing     Problem: Chronic Conditions and Co-morbidities  Goal: Patient's chronic conditions and co-morbidity symptoms are monitored and maintained or improved  2/22/2023 1140 by Alis Palencia RN  Outcome: Progressing  2/22/2023 0146 by Rashid Zapata RN  Outcome: Progressing

## 2023-02-22 NOTE — PROGRESS NOTES
Daily Progress Note    Date:2023  Patient: Michelet Pal Sr.  : 1950  KDB:[id number]  CODE:Full Code No additional code details  PCP:No primary care provider on file. Admit Date: 2023 12:59 PM   LOS: 1 day     Chief Complaint   Patient presents with    Back Pain     Turned over in bed and had a sharp pain in left lower back     Shortness of Breath         Subjective: NAEON. Back pain is improved from admission. He was able to work with PT today. Walked ~200 ft with only minimal weight on walker. He plans to return to work at discharge, does not require strenuous exercise, mostly sitting at a desk. Hospital Summary: 72 yo M with T2DM, HTN, CAD, psoriasis with psoriatic arthritis who presented to [facility] ED with acute lower back pain. He was admitted to hospitalist service for further management. There was initially concern for cellulitis of the flank and patient was started on broad spectrum antibiotics. No evidence of erythema on exam, no inflammatory changes on CT, and no systemic symptoms of infection so antibiotics were discontinued. MRI lumbar spine showed bilateral neural foraminal stenosis L4-L5 and L5-S1, but no severe spinal canal stenosis. Pt started on Tizanidine and steroids. Unable to tolerate NSAIDs due to CKD4. Review of Systems   Constitutional:  Negative for chills and fever. Respiratory:  Negative for cough and shortness of breath. Cardiovascular:  Negative for chest pain and palpitations. Gastrointestinal:  Negative for abdominal pain, nausea and vomiting.      Objective:      Vital signs in last 24 hours:  Patient Vitals for the past 24 hrs:   BP Temp Temp src Pulse Resp SpO2   23 1416 -- -- -- -- 18 --   23 1127 (!) 147/72 97.9 °F (36.6 °C) Oral 64 18 93 %   23 1117 -- -- -- -- -- 93 %   23 0845 -- -- -- -- 18 --   23 0815 -- -- -- -- 18 --   23 0513 131/65 98.1 °F (36.7 °C) -- 68 18 95 %   23 0258 (!) 151/74 -- -- 72 18 96 %   02/22/23 0257 -- -- -- -- 18 --   02/22/23 0109 (!) 121/58 98.4 °F (36.9 °C) Oral 67 18 93 %   02/21/23 2245 (!) 160/76 99 °F (37.2 °C) Oral 81 18 92 %   02/21/23 1940 (!) 160/66 -- -- 81 18 93 %   02/21/23 1748 (!) 140/72 98.2 °F (36.8 °C) Oral 70 18 90 %   02/21/23 1653 -- -- -- -- 18 --       I/O last 3 completed shifts: In: 0680 [P.O.:1410; I.V.:145]  Out: 600 [Urine:600]  I/O this shift: In: 18 [P.O.:570]  Out: -     Physical Exam  Constitutional:       General: He is not in acute distress. Appearance: Normal appearance. He is not toxic-appearing. Cardiovascular:      Rate and Rhythm: Normal rate and regular rhythm. Pulses: Normal pulses. Heart sounds: Normal heart sounds. Pulmonary:      Effort: Pulmonary effort is normal. No respiratory distress. Breath sounds: Normal breath sounds. Skin:     Comments: No erythema on bilateral flanks, mildly erythematous plaques on lower abdomen and buttocks.            Lab Review   Recent Results (from the past 24 hour(s))   POCT Glucose    Collection Time: 02/21/23  5:17 PM   Result Value Ref Range    POC Glucose 146 (H) 70 - 99 mg/dl    Performed on AccuChek    POCT Glucose    Collection Time: 02/21/23  8:12 PM   Result Value Ref Range    POC Glucose 206 (H) 70 - 99 mg/dl    Performed on AccuChek    Basic Metabolic Panel w/ Reflex to MG    Collection Time: 02/22/23  2:56 AM   Result Value Ref Range    Sodium 137 136 - 145 mmol/L    Potassium reflex Magnesium 4.3 3.5 - 5.0 mmol/L    Chloride 104 98 - 111 mmol/L    CO2 19 (L) 22 - 29 mmol/L    Anion Gap 14 7 - 19 mmol/L    Glucose 197 (H) 74 - 109 mg/dL    BUN 38 (H) 8 - 23 mg/dL    Creatinine 2.6 (H) 0.5 - 1.2 mg/dL    Est, Glom Filt Rate 25 (A) >60    Calcium 9.3 8.8 - 10.2 mg/dL   CBC with Auto Differential    Collection Time: 02/22/23  2:56 AM   Result Value Ref Range    WBC 7.3 4.8 - 10.8 K/uL    RBC 3.65 (L) 4.70 - 6.10 M/uL    Hemoglobin 11.2 (L) 14.0 - 18.0 g/dL    Hematocrit 32.0 (L) 42.0 - 52.0 %    MCV 87.7 80.0 - 94.0 fL    MCH 30.7 27.0 - 31.0 pg    MCHC 35.0 33.0 - 37.0 g/dL    RDW 13.1 11.5 - 14.5 %    Platelets 266 097 - 656 K/uL    MPV 10.0 9.4 - 12.4 fL    Neutrophils % 82.8 (H) 50.0 - 65.0 %    Lymphocytes % 14.6 (L) 20.0 - 40.0 %    Monocytes % 1.8 0.0 - 10.0 %    Eosinophils % 0.0 0.0 - 5.0 %    Basophils % 0.1 0.0 - 1.0 %    Neutrophils Absolute 6.1 1.5 - 7.5 K/uL    Immature Granulocytes # 0.1 K/uL    Lymphocytes Absolute 1.1 1.1 - 4.5 K/uL    Monocytes Absolute 0.10 0.00 - 0.90 K/uL    Eosinophils Absolute 0.00 0.00 - 0.60 K/uL    Basophils Absolute 0.00 0.00 - 0.20 K/uL   Vancomycin Level, Trough    Collection Time: 02/22/23  2:56 AM   Result Value Ref Range    Vancomycin Tr 17.9 10.0 - 20.0 ug/mL   POCT Glucose    Collection Time: 02/22/23  7:44 AM   Result Value Ref Range    POC Glucose 184 (H) 70 - 99 mg/dl    Performed on AccuChek    POCT Glucose    Collection Time: 02/22/23 10:53 AM   Result Value Ref Range    POC Glucose 307 (H) 70 - 99 mg/dl    Performed on AccuChek              Current Facility-Administered Medications:     HYDROcodone-acetaminophen (NORCO) 7.5-325 MG per tablet 1 tablet, 1 tablet, Oral, Q6H PRN, Jennifer Moreland MD, 1 tablet at 02/22/23 1416    sennosides-docusate sodium (SENOKOT-S) 8.6-50 MG tablet 2 tablet, 2 tablet, Oral, Daily, Jennifer Moreland MD, 2 tablet at 02/22/23 0916    tiZANidine (ZANAFLEX) tablet 4 mg, 4 mg, Oral, TID, Jennifer Moreland MD    predniSONE (DELTASONE) tablet 20 mg, 20 mg, Oral, Daily, Jennifer Moreland MD    gabapentin (NEURONTIN) capsule 300 mg, 300 mg, Oral, TID, Jennifer Moreland MD    lidocaine 4 % external patch 1 patch, 1 patch, TransDERmal, Daily, Leanne Albrecht MD, 1 patch at 02/22/23 0817    sodium bicarbonate tablet 650 mg, 650 mg, Oral, 4x Daily, Anni Pop MD, 650 mg at 02/22/23 1416    iron sucrose (VENOFER) injection 200 mg, 200 mg, IntraVENous, Q24H, Anni Pop MD, 200 mg at 02/22/23 1420 amLODIPine (NORVASC) tablet 10 mg, 10 mg, Oral, Daily, Kade Barajas MD, 10 mg at 02/22/23 0815    aspirin EC tablet 81 mg, 81 mg, Oral, Daily, Kade Barajas MD, 81 mg at 02/22/23 0815    vitamin B and C (TOTAL B-C) 1 tablet, 1 tablet, Oral, Daily, Kade Barajas MD, 1 tablet at 02/22/23 0815    clopidogrel (PLAVIX) tablet 75 mg, 75 mg, Oral, Daily, Kade Barajas MD, 75 mg at 02/22/23 0815    doxazosin (CARDURA) tablet 4 mg, 4 mg, Oral, Daily, Kade Barajas MD, 4 mg at 02/22/23 0815    DULoxetine (CYMBALTA) extended release capsule 20 mg, 20 mg, Oral, Daily, Kade Barajas MD, 20 mg at 02/22/23 0815    empagliflozin (JARDIANCE) tablet 25 mg, 25 mg, Oral, Daily, Kade Barajas MD, 25 mg at 02/22/23 0815    ezetimibe (ZETIA) tablet 10 mg, 10 mg, Oral, Daily, Kade Barajas MD, 10 mg at 02/22/23 0815    finasteride (PROSCAR) tablet 5 mg, 5 mg, Oral, Daily, Kade Barajas MD, 5 mg at 02/22/23 0815    insulin glargine (LANTUS) injection vial 35 Units, 35 Units, SubCUTAneous, BID, Kade Barajas MD, 35 Units at 02/22/23 8228    Vitamin D (CHOLECALCIFEROL) tablet 2,000 Units, 2,000 Units, Oral, Daily, Kade Barajas MD, 2,000 Units at 02/22/23 0815    rOPINIRole (REQUIP) tablet 2 mg, 2 mg, Oral, BID, Kade Barajas MD, 2 mg at 02/22/23 0815    rosuvastatin (CRESTOR) tablet 10 mg, 10 mg, Oral, Daily, Kade Barajas MD, 10 mg at 02/22/23 0815    pantoprazole (PROTONIX) tablet 20 mg, 20 mg, Oral, QAM AC, Kade Barajas MD, 20 mg at 02/22/23 0516    sodium chloride flush 0.9 % injection 5-40 mL, 5-40 mL, IntraVENous, 2 times per day, Kade Barajas MD, 10 mL at 02/21/23 0816    sodium chloride flush 0.9 % injection 5-40 mL, 5-40 mL, IntraVENous, PRN, Kade Barajas MD, 10 mL at 02/20/23 1651    0.9 % sodium chloride infusion, , IntraVENous, PRN, Kade Barajas MD    enoxaparin Sodium (LOVENOX) injection 30 mg, 30 mg, SubCUTAneous, BID, Kade Barajas MD, 30 mg at 02/22/23 0815    ondansetron (ZOFRAN-ODT) disintegrating tablet 4 mg, 4 mg, Oral, Q8H PRN **OR** ondansetron (ZOFRAN) injection 4 mg, 4 mg, IntraVENous, Q6H PRN, Esthela Parikh MD    acetaminophen (TYLENOL) tablet 650 mg, 650 mg, Oral, Q4H PRN **OR** acetaminophen (TYLENOL) suppository 650 mg, 650 mg, Rectal, Q6H PRN, Esthela Parikh MD    potassium chloride (KLOR-CON M) extended release tablet 40 mEq, 40 mEq, Oral, PRN **OR** potassium bicarb-citric acid (EFFER-K) effervescent tablet 40 mEq, 40 mEq, Oral, PRN **OR** potassium chloride 10 mEq/100 mL IVPB (Peripheral Line), 10 mEq, IntraVENous, PRN, Esthela Parikh MD    magnesium sulfate 2000 mg in 50 mL IVPB premix, 2,000 mg, IntraVENous, PRN, Esthela Parikh MD    sodium phosphate 36.3 mmol in sodium chloride 0.9 % 250 mL IVPB, 0.32 mmol/kg, IntraVENous, PRN, Esthela Parikh MD    polyethylene glycol (GLYCOLAX) packet 17 g, 17 g, Oral, Daily PRN, Esthela Parikh MD    melatonin disintegrating tablet 5 mg, 5 mg, Oral, Nightly PRN, Esthela Parikh MD    calcium carbonate (TUMS) chewable tablet 500 mg, 500 mg, Oral, TID PRN, Esthela Parikh MD, 500 mg at 02/21/23 0508    insulin lispro (HUMALOG) injection vial 0-8 Units, 0-8 Units, SubCUTAneous, TID WC, Esthela Parikh MD, 6 Units at 02/22/23 1123    insulin lispro (HUMALOG) injection vial 0-4 Units, 0-4 Units, SubCUTAneous, Nightly, Esthela Parikh MD    glucose chewable tablet 16 g, 4 tablet, Oral, PRN, Esthela Parikh MD    dextrose bolus 10% 125 mL, 125 mL, IntraVENous, PRN **OR** dextrose bolus 10% 250 mL, 250 mL, IntraVENous, PRN, Esthela Parikh MD    glucagon (rDNA) injection 1 mg, 1 mg, SubCUTAneous, PRN, Esthela Parikh MD    dextrose 10 % infusion, , IntraVENous, Continuous PRN, Esthela Parikh MD    naloxone [facility]) injection 0.4 mg, 0.4 mg, IntraVENous, PRN, Esthela Parikh MD    losartan (COZAAR) tablet 100 mg, 100 mg, Oral, Daily, 100 mg at 02/22/23 0815 **AND** hydroCHLOROthiazide (HYDRODIURIL) tablet 25 mg, 25 mg, Oral, Daily, Esthela Parikh MD, 25 mg at 02/22/23 5803      Imaging:  CT CHEST WO CONTRAST    Result Date: 2/20/2023  There are bibasilar areas of atelectasis and/or scarring. There is evidence for old granulomatous disease. There are old healed left rib fractures. There is a compression fracture of the T3 vertebral body of undetermined age. Electronically signed by Kate Yang MD on 02-20-23 at [address]    Result Date: 2/20/2023  No acute fracture. Electronically signed by Edwina Rain MD on 02-20-23 at [address]    Result Date: 2/21/2023  Impression: Lumbar spondylosis with bilateral neural foraminal stenosis at L3-L4, L4-L5, and L5-S1. No significant central spinal canal stenosis is identified within the lumbar spine. Please see above for additional details. US RENAL COMPLETE    Result Date: 2/21/2023  There is no evidence of hydronephrosis. The kidneys demonstrate diffusely increased cortical echogenicity, suggestive of chronic medical renal disease. CT KIDNEY WO CONTRAST    Result Date: 2/20/2023  There is perinephric stranding. No evidence of renal calculi or hydronephrosis. Electronically signed by Kate Yang MD on 02-20-23 at 1817    XR CHEST PORTABLE    Result Date: 2/19/2023  Atelectasis in right lung base NO EVIDENCE OF AIRSPACE CONSOLIDATION OR PULMONARY VENOUS CONGESTION. US EXTREMITY LEFT NON VASC LIMITED    Result Date: 2/20/2023  No abnormality identified in the area of concern in the left calf region         Assessment/Plan  Active Problems:    Intractable low back pain    Psoriatic arthritis ([address]  Resolved Problems:    * No resolved hospital problems. *       I have reviewed the patient's daily labs, including CBC and BMP with pertinent results discussed below. Pt was discussed with case management during daily IDRs. He should return home at discharge within the next 1-2 days pending PT/OT eval.    Patient was being treated for possible cellulitis of the flank.  There was no evidence of cellulitis on my exam. Pt has no systemic signs of infection and WBC is normal at 7.3. Vancomycin discontinued. Intractable low back pain  -- Likely paraspinal muscle strain in the setting of underlying lumbar spondylosis with bilateral neural foraminal stenosis  -- Could be related to underlying psoriatic arthritis, but there was no evidence of sacroiliitis on MRI of lumbar spine  -- I reviewed images from MRI of lumbar spine, no significant spinal canal stenosis which is consistent with radiology report.  Radiology read also reported bilateral neural foraminal stenosis  -- NSAIDs would be gold-standard of treatment, but contraindicated due to pt's CKD  -- Schedule Tizanidine 4 mg TID  -- DC IV Dilaudid  -- Start Norco 7.5 mg Q6H PRN  -- Start Prednisone 20 mg daily; there is no evidence that this is effective for acute lower back pain, but it may help given underlying psoriatic arthritis  -- Continue PT/OT  -- Avoid bending/stretching at the waist; no strenuous activity    Psoriasis with possible psoriatic arthritis  -- Recommend rheumatology follow up as outpatient    CKD stage 4  -- Cr 2.6, GFR 25  -- Reviewed available records from [facility], baseline GFR is ~26  -- Avoid nephrotoxins      I ordered CBC and BMP for tomorrow morning      DVT prophylaxis: Lovenox    DISPOSITION:  Discharge home with outpatient physical therapy tomorrow    Full Code No additional code details        Enmanuel Green MD 2/22/2023 2:34 PM

## 2023-02-22 NOTE — CARE COORDINATION
Case Management Assessment  Initial Evaluation    Date/Time of Evaluation: 2/22/2023 5:49 PM  Assessment Completed by: MARISA Monaco    If patient is discharged prior to next notation, then this note serves as note for discharge by case management. Patient Name: Patricio Chavez Sr. YOB: 1950  Diagnosis: Intractable back pain [M54.9]  Suspected soft tissue infection [R68.89]  Flank pain [R10.9]                   Date / Time: 2/19/2023 12:59 PM    Patient Admission Status: Inpatient   Readmission Risk (Low < 19, Mod (19-27), High > 27): Readmission Risk Score: 13.7    Current PCP: No primary care provider on file. PCP verified by CM? Yes (Pt has [address] doc and PCP; in [place]; he only works here)    Chart Reviewed: Yes      History Provided by: Patient, Medical Record  Patient Orientation: Alert and Oriented    Patient Cognition: Alert    Hospitalization in the last 30 days (Readmission):  No    If yes, Readmission Assessment in CM Navigator will be completed. Advance Directives:      Code Status: Full Code   Patient's Primary Decision Maker is: Legal Next of Kin      Discharge Planning:    Patient lives with: Alone (working here; lives in [place]; wife is here presently) Type of Home: Apartment  Primary Care Giver: Self  Patient Support Systems include: Spouse/Significant Other, Family Members, Friends/Neighbors   Current Financial resources: Medicare, [place] ([address])  Current community resources: None  Current services prior to admission: None            Current DME:              Type of Home Care services:  None    ADLS  Prior functional level: Independent in ADLs/IADLs  Current functional level: Independent in ADLs/IADLs    PT AM-PAC:   /24  OT AM-PAC:   /24    Family can provide assistance at DC: Yes  Would you like Case Management to discuss the discharge plan with any other family members/significant others, and if so, who?     Plans to Return to Present Housing: Yes  Other Identified Issues/Barriers to RETURNING to current housing: none  Potential Assistance needed at discharge: [facility]            Potential DME: Luis Angel Gibson  Patient expects to discharge to: [address] [place] [address] for transportation at discharge: Self    Financial    Payor: Solange Avalos / Plan: Solange Avalos / Product Type: *No Product type* /     Does insurance require precert for SNF: Yes    Potential assistance Purchasing Medications: No  Meds-to-Beds request:        [facility]/pharmacy #[id number]- [address], [address] - F [phone]  [address] [place] [address] [address] [facility] [address]  Phone: [phone] Fax: [phone]      Notes:    Factors facilitating achievement of predicted outcomes: Family support, Friend support, Motivated, and Cooperative    Barriers to discharge: Pain and Limited family support    Additional Case Management Notes: SW visited with Pt and spouse at bedside; Pt is here in [facility]; lives in an [facility] locally; lives in [place]; normally IND with all ADL and IADL needs; would like RW at d/c and would like therapy to develop an HEP for him to do at home instead of outpt PT or HH; The Plan for Transition of Care is related to the following treatment goals of Intractable back pain [M54.9]  Suspected soft tissue infection [R68.89]  Flank pain [R30.6]    IF APPLICABLE: The Patient and/or patient representative Andres Bhatti and his family were provided with a choice of provider and agrees with the discharge plan. Freedom of choice list with basic dialogue that supports the patient's individualized plan of care/goals and shares the quality data associated with the providers was provided to:     Patient Representative Name:       The Patient and/or Patient Representative Agree with the Discharge Plan?       Emely Jules [facility]  Case Management Department  Ph: [phone] Fax: [phone]  Electronically signed by MARISA Olivares on 2/22/2023 at 5:51 PM               Michelle Coma Scale  Eye Opening: Spontaneous  Best Verbal Response: Oriented  Best Motor Response: Obeys commands  Michelle Coma Scale Score: 15    Patient Deficit Notes:

## 2023-02-22 NOTE — CARE COORDINATION
02/22/23 1700   IMM Letter   IMM Letter given to Patient/Family/Significant other/Guardian/POA/by: SW gave letter to Pt and explained; changed from obs to inpt; Dov Knife   IMM Letter date given: 02/22/23   IMM Letter time given: 1700   Electronically signed by MARISA Sofia on 2/22/2023 at 5:20 PM

## 2023-02-23 VITALS
DIASTOLIC BLOOD PRESSURE: 74 MMHG | RESPIRATION RATE: 18 BRPM | OXYGEN SATURATION: 96 % | HEIGHT: 74 IN | SYSTOLIC BLOOD PRESSURE: 144 MMHG | TEMPERATURE: 97.7 F | WEIGHT: 244.06 LBS | HEART RATE: 52 BPM | BODY MASS INDEX: 31.32 KG/M2

## 2023-02-23 LAB
ANION GAP SERPL CALCULATED.3IONS-SCNC: 13 MMOL/L (ref 7–19)
BASOPHILS ABSOLUTE: 0 K/UL (ref 0–0.2)
BASOPHILS RELATIVE PERCENT: 0.3 % (ref 0–1)
BUN BLDV-MCNC: 54 MG/DL (ref 8–23)
CALCIUM SERPL-MCNC: 9.1 MG/DL (ref 8.8–10.2)
CHLORIDE BLD-SCNC: 101 MMOL/L (ref 98–111)
CO2: 22 MMOL/L (ref 22–29)
CREAT SERPL-MCNC: 2.4 MG/DL (ref 0.5–1.2)
EOSINOPHILS ABSOLUTE: 0 K/UL (ref 0–0.6)
EOSINOPHILS RELATIVE PERCENT: 0 % (ref 0–5)
GFR SERPL CREATININE-BSD FRML MDRD: 28 ML/MIN/{1.73_M2}
GLUCOSE BLD-MCNC: 218 MG/DL (ref 74–109)
HCT VFR BLD CALC: 28.3 % (ref 42–52)
HEMOGLOBIN: 10.1 G/DL (ref 14–18)
IMMATURE GRANULOCYTES #: 0.1 K/UL
LYMPHOCYTES ABSOLUTE: 1.5 K/UL (ref 1.1–4.5)
LYMPHOCYTES RELATIVE PERCENT: 19.3 % (ref 20–40)
MCH RBC QN AUTO: 30.6 PG (ref 27–31)
MCHC RBC AUTO-ENTMCNC: 35.7 G/DL (ref 33–37)
MCV RBC AUTO: 85.8 FL (ref 80–94)
MONOCYTES ABSOLUTE: 0.3 K/UL (ref 0–0.9)
MONOCYTES RELATIVE PERCENT: 4.3 % (ref 0–10)
NEUTROPHILS ABSOLUTE: 5.7 K/UL (ref 1.5–7.5)
NEUTROPHILS RELATIVE PERCENT: 75.1 % (ref 50–65)
PDW BLD-RTO: 13.1 % (ref 11.5–14.5)
PLATELET # BLD: 214 K/UL (ref 130–400)
PMV BLD AUTO: 9.9 FL (ref 9.4–12.4)
POTASSIUM REFLEX MAGNESIUM: 4.3 MMOL/L (ref 3.5–5)
RBC # BLD: 3.3 M/UL (ref 4.7–6.1)
SODIUM BLD-SCNC: 136 MMOL/L (ref 136–145)
WBC # BLD: 7.6 K/UL (ref 4.8–10.8)

## 2023-02-23 PROCEDURE — 80048 BASIC METABOLIC PNL TOTAL CA: CPT

## 2023-02-23 PROCEDURE — 85025 COMPLETE CBC W/AUTO DIFF WBC: CPT

## 2023-02-23 PROCEDURE — 36415 COLL VENOUS BLD VENIPUNCTURE: CPT

## 2023-02-23 PROCEDURE — 6370000000 HC RX 637 (ALT 250 FOR IP): Performed by: HOSPITALIST

## 2023-02-23 PROCEDURE — 94760 N-INVAS EAR/PLS OXIMETRY 1: CPT

## 2023-02-23 PROCEDURE — 6360000002 HC RX W HCPCS: Performed by: HOSPITALIST

## 2023-02-23 PROCEDURE — 2580000003 HC RX 258: Performed by: HOSPITALIST

## 2023-02-23 PROCEDURE — 6370000000 HC RX 637 (ALT 250 FOR IP): Performed by: STUDENT IN AN ORGANIZED HEALTH CARE EDUCATION/TRAINING PROGRAM

## 2023-02-23 RX ORDER — POLYETHYLENE GLYCOL 3350 17 G/17G
17 POWDER, FOR SOLUTION ORAL DAILY PRN
Qty: 527 G | Refills: 1 | Status: SHIPPED | OUTPATIENT
Start: 2023-02-23 | End: 2023-04-26

## 2023-02-23 RX ORDER — TIZANIDINE 4 MG/1
4 TABLET ORAL EVERY 6 HOURS PRN
Qty: 40 TABLET | Refills: 0 | Status: SHIPPED | OUTPATIENT
Start: 2023-02-23

## 2023-02-23 RX ORDER — PREDNISONE 20 MG/1
20 TABLET ORAL DAILY
Qty: 4 TABLET | Refills: 0 | Status: SHIPPED | OUTPATIENT
Start: 2023-02-24 | End: 2023-02-23 | Stop reason: SDUPTHER

## 2023-02-23 RX ORDER — PREDNISONE 20 MG/1
20 TABLET ORAL DAILY
Qty: 3 TABLET | Refills: 0 | Status: SHIPPED | OUTPATIENT
Start: 2023-02-24 | End: 2023-02-27

## 2023-02-23 RX ADMIN — EMPAGLIFLOZIN 25 MG: 25 TABLET, FILM COATED ORAL at 07:46

## 2023-02-23 RX ADMIN — HYDROCHLOROTHIAZIDE 25 MG: 25 TABLET ORAL at 07:47

## 2023-02-23 RX ADMIN — PANTOPRAZOLE SODIUM 20 MG: 20 TABLET, DELAYED RELEASE ORAL at 05:05

## 2023-02-23 RX ADMIN — Medication 1 TABLET: at 07:46

## 2023-02-23 RX ADMIN — EZETIMIBE 10 MG: 10 TABLET ORAL at 07:46

## 2023-02-23 RX ADMIN — INSULIN GLARGINE 35 UNITS: 100 INJECTION, SOLUTION SUBCUTANEOUS at 07:49

## 2023-02-23 RX ADMIN — DOXAZOSIN 4 MG: 2 TABLET ORAL at 07:47

## 2023-02-23 RX ADMIN — GABAPENTIN 300 MG: 300 CAPSULE ORAL at 07:45

## 2023-02-23 RX ADMIN — Medication 2000 UNITS: at 07:47

## 2023-02-23 RX ADMIN — LOSARTAN POTASSIUM 100 MG: 100 TABLET, FILM COATED ORAL at 07:45

## 2023-02-23 RX ADMIN — CLOPIDOGREL BISULFATE 75 MG: 75 TABLET ORAL at 07:46

## 2023-02-23 RX ADMIN — TIZANIDINE 4 MG: 4 TABLET ORAL at 07:45

## 2023-02-23 RX ADMIN — FINASTERIDE 5 MG: 5 TABLET, FILM COATED ORAL at 07:47

## 2023-02-23 RX ADMIN — SODIUM BICARBONATE 650 MG: 650 TABLET ORAL at 07:45

## 2023-02-23 RX ADMIN — ROPINIROLE HYDROCHLORIDE 2 MG: 2 TABLET, FILM COATED ORAL at 07:46

## 2023-02-23 RX ADMIN — AMLODIPINE BESYLATE 10 MG: 5 TABLET ORAL at 07:46

## 2023-02-23 RX ADMIN — DULOXETINE 20 MG: 20 CAPSULE, DELAYED RELEASE ORAL at 07:46

## 2023-02-23 RX ADMIN — ROSUVASTATIN CALCIUM 10 MG: 10 TABLET, FILM COATED ORAL at 07:47

## 2023-02-23 RX ADMIN — PREDNISONE 20 MG: 20 TABLET ORAL at 07:46

## 2023-02-23 RX ADMIN — INSULIN LISPRO 2 UNITS: 100 INJECTION, SOLUTION INTRAVENOUS; SUBCUTANEOUS at 07:58

## 2023-02-23 RX ADMIN — SODIUM CHLORIDE, PRESERVATIVE FREE 10 ML: 5 INJECTION INTRAVENOUS at 07:48

## 2023-02-23 RX ADMIN — ASPIRIN 81 MG: 81 TABLET, COATED ORAL at 07:46

## 2023-02-23 RX ADMIN — ENOXAPARIN SODIUM 30 MG: 100 INJECTION SUBCUTANEOUS at 07:44

## 2023-02-23 RX ADMIN — SENNOSIDES AND DOCUSATE SODIUM 2 TABLET: 50; 8.6 TABLET ORAL at 07:46

## 2023-02-23 NOTE — PROGRESS NOTES
CLINICAL PHARMACY NOTE: MEDS TO BEDS    Total # of Prescriptions Filled: 3   The following medications were delivered to the patient:  Prednisone 20mg  Miralax  Tizanidine 4mg    Additional Documentation:   The patient paid with a credit card and was handed the medications at his bedside

## 2023-02-23 NOTE — DISCHARGE SUMMARY
[place], [place], [address]    DEPARTMENT OF HOSPITALIST MEDICINE      DISCHARGE SUMMARY:      PATIENT NAME:  Manuel García Sr.  :  1950  MRN:  [id number]    Admission Date:   2023 12:59 PM Attending: Timi Magdaleno MD   Discharge Date:   2023              PCP: No primary care provider on file. Length of Stay: 2 days     Chief Complaint on Admission:   Chief Complaint   Patient presents with    Back Pain     Turned over in bed and had a sharp pain in left lower back     Shortness of Breath       Consultants:     [address] [place] [address]  COURSE  AND  TREATMENT:  70 yo M with T2DM, HTN, CAD, psoriasis with psoriatic arthritis who presented to [facility] ED with acute lower back pain. He was admitted to hospitalist service for further management. There was initially concern for cellulitis of the flank and patient was started on broad spectrum antibiotics. No evidence of erythema on exam, no inflammatory changes on CT, and no systemic symptoms of infection so antibiotics were discontinued. MRI lumbar spine showed bilateral neural foraminal stenosis L4-L5 and L5-S1, but no severe spinal canal stenosis. Pt started on Tizanidine and steroids. Unable to tolerate NSAIDs due to CKD4. Back pain improved with steroids and muscle relaxers. Seen by PT/OT and was able to ambulate 200 ft with walker without assistance. He was discharged home in stable condition with 10 day supply of Tizanidine and 4 doses of steroids to complete 5 day course. Discharge Problem List:   Active Problems:    Intractable low back pain    Psoriatic arthritis ([address]  Resolved Problems:    * No resolved hospital problems. *         Last dated Assessment and Plan . ..  23        OBJECTIVE:  BP (!) 144/74   Pulse 52   Temp 97.7 °F (36.5 °C) (Oral)   Resp 18   Ht 6' 2\" (1.88 m)   Wt 244 lb 1 oz (110.7 kg)   SpO2 95%   BMI 31.34 kg/m²       Heart: RRR   Lungs: Bilateral fair air entry   Abdomen: Soft, non-tender   Extremities: No edema   Neurologic: Alert and oriented   Skin: Warm and dry          Laboratory Data:  Recent Labs     02/21/23  0235 02/22/23  0256 02/23/23  0310   WBC 9.3 7.3 7.6   HGB 11.3* 11.2* 10.1*    201 214     Recent Labs     02/21/23  0235 02/22/23  0256 02/23/23  0310    137 136   K 4.0 4.3 4.3    104 101   CO2 21* 19* 22   BUN 33* 38* 54*   CREATININE 2.4* 2.6* 2.4*   GLUCOSE 178* 197* 218*     No results for input(s): AST, ALT, ALB, BILITOT, ALKPHOS in the last 72 hours. Troponin T:   Recent Labs     02/21/23 0235   TROPONINI <0.01     Pro-BNP: No results for input(s): BNP in the last 72 hours. INR: No results for input(s): INR in the last 72 hours. UA:No results for input(s): NITRITE, COLORU, PHUR, LABCAST, WBCUA, RBCUA, MUCUS, TRICHOMONAS, YEAST, BACTERIA, CLARITYU, SPECGRAV, LEUKOCYTESUR, UROBILINOGEN, BILIRUBINUR, BLOODU, GLUCOSEU, AMORPHOUS in the last 72 hours. Invalid input(s): Adam Sharp  A1C: No results for input(s): LABA1C in the last 72 hours. ABG:No results for input(s): PHART, EYI7AXP, PO2ART, NZF9BLU, BEART, HGBAE, A2YLMBXJ, CARBOXHGBART in the last 72 hours. Impressions of imaging performed in 48 hours before discharge:    CT CHEST WO CONTRAST    Result Date: 2/20/2023  Patient: Ann-Marie Wilson  Time Out: 05:37Exam(s): CT CHEST Without Contrast EXAM:  CT Chest Without Intravenous ContrastCLINICAL HISTORY:   Reason for exam: posterior rib pain, ?crepitus. TECHNIQUE:  Axial computed tomography images of the chest without intravenous contrast.  CTDI is 27.85 mGy and DLP is 1299.98 mGy-cm. COMPARISON:  No relevant prior studies available. FINDINGS:  Lungs: There are linear densities noted the lung bases. There is a 6 mm calcification on the right lower lobe. .  Pleural space: No pneumothorax. No significant effusion. Heart: The heart is not enlarged but contains extensive coronary artery calcifications. Bones/joints: There are old healed left rib fractures. There are some degenerative changes in the spine. There is a compression fracture of the T3 vertebral body. .  Soft tissues:  Unremarkable. Vasculature: There is ectasia and calcification noted of the thoracic aorta. No thoracic aortic aneurysm. Lymph nodes: There are calcified lymph nodes noted in the right hilar region. There are small mediastinal lymph nodes present. There are bibasilar areas of atelectasis and/or scarring. There is evidence for old granulomatous disease. There are old healed left rib fractures. There is a compression fracture of the T3 vertebral body of undetermined age. Electronically signed by Sharmaine Rios MD on 02-20-23 at [address] [employer]    Result Date: 2/20/2023  Patient: Katherine Mayfield  Time Out: 03:24Exam(s): CT L SPINE EXAM:  CT Lumbar Spine Without Intravenous ContrastCLINICAL HISTORY:   Reason for exam: low back pain. TECHNIQUE:  Axial computed tomography images of the lumbar spine without intravenous contrast.  CTDI is 32.69 mGy and DLP is 1174.99 mGy-cm. COMPARISON:  No relevant prior studies available. FINDINGS:  Vertebrae:  No acute fracture. Minimal levoscoliosis. Discs/spinal canal/neural foramina: Severe disc height loss at L5-S1. No significant central canal stenosis. Mild to moderate bilateral foraminal stenosis L4-5 and L5-S1. Soft tissues: Ectatic aorta measuring 2.7 cm. No acute fracture. Electronically signed by Landy Weiner MD on 02-20-23 at [facility]    Result Date: 2/21/2023  Examination: MRI lumbar spine without IV contrast Clinical history: Severe back pain, acute Comparison: None Technique: Multiplanar multisequence MR images of the lumbar spine were performed without IV contrast. FINDINGS: Lumbar spine alignment is unremarkable. There are Modic type I degenerative endplate changes at N3-S5. Otherwise, marrow signal is unremarkable. Vertebral body heights are normal.Lumbar discs are desiccated, and there is disc space narrowing at L4-L5 and L5-S1. The conus terminates normally at T12/L1. No abnormal signal is identified within the Conus. The visualized paraspinal soft tissues are without significant abnormality. Small bilateral renal cysts are noted. L1-L2: No significant spinal canal or neural foraminal stenosis. L2-L3: No significant spinal canal or neural foraminal stenosis. L3-L4: Disc bulging and facet arthropathy contribute to moderate LEFT and mild RIGHT neural foraminal narrowing. Spinal canal appears patent. L4-L5: Broad-based disc bulging and facet arthropathy contribute to moderate to severe bilateral neural foraminal stenosis. Spinal canal is not significantly narrowed. L5-S1: Posterior disc osteophyte complex and facet arthropathy contribute to moderate to severe bilateral neural foraminal stenosis, greater on the LEFT. Spinal canal appears patent. Impression: Lumbar spondylosis with bilateral neural foraminal stenosis at L3-L4, L4-L5, and L5-S1. No significant central spinal canal stenosis is identified within the lumbar spine. Please see above for additional details. US RENAL COMPLETE    Result Date: 2/21/2023  STUDY: RENAL ULTRASOUND - COMPLETE REASON FOR EXAM: Severe flank pain TECHNIQUE: Ultrasound evaluation of the kidneys was performed with real-time and static grey-scale imaging. COMPARISON: None. ___________________________________ FINDINGS: Both kidneys demonstrate mildly increased cortical echogenicity without evidence of hydronephrosis. The right kidney measures up to 11.9 cm in length. The left kidney measures up to 13.8 cm in length. There are multiple cysts seen at the left kidney measuring up to 1.3 cm. The visualized portions of the urinary bladder are grossly unremarkable. No free intraperitoneal fluid is seen. The IVC and abdominal aorta were not visualized. There is no evidence of hydronephrosis.  The kidneys demonstrate diffusely increased cortical echogenicity, suggestive of chronic medical renal disease. CT KIDNEY WO CONTRAST    Result Date: 2/20/2023  Patient: Collins Perez  Time Out: 03:36Exam(s): CT RENAL Without Contrast EXAM:  CT Abdomen and Pelvis Without Intravenous Contrast, Renal Stone ProtocolCLINICAL HISTORY:   Reason for exam: left flank pain. TECHNIQUE:  Axial computed tomography images of the abdomen and pelvis without intravenous contrast using renal protocol. CTDI is 30.12 mGy and DLP is 1804.94 mGy-cm. COMPARISON:  No relevant prior studies available. FINDINGS:  Lower thorax: There are mild bibasilar areas of scarring and/or atelectasis. The heart contains extensive coronary artery calcifications. ABDOMEN:  Liver: Lucencies within the liver appear to represent unopacified blood vessels. Gallbladder and bile ducts:  Unremarkable. No calcified stones. No ductal dilation. Pancreas: The visualized portions of the pancreas, on this noncontrast study, are grossly unremarkable. Spleen: There are small calcifications within the spleen most compatible with old granulomatous disease. Adrenals:   No mass. Right kidney and ureter: There is perinephric stranding. No obstructing stones. No hydronephrosis. Left kidney and ureter: There is perinephric stranding. There. No obstructing stones. No hydronephrosis. Stomach and bowel: The stomach is distended containing retained foodstuffs as well as air. There is air and stool noted in the colon. Annelle Castles PELVIS:  Appendix: Unremarkable CT scan appearance noted of the appendix. Bladder:  Unremarkable. No stones. Reproductive:  Unremarkable as visualized. ABDOMEN and PELVIS:  Intraperitoneal space:   No free air. No significant fluid collection. Bones/joints: There are some degenerative changes in the spine. Soft tissues:  Unremarkable. Vasculature: There are extensive atherosclerotic changes. No abdominal aortic aneurysm.   Lymph nodes:    No enlarged lymph nodes.    There is perinephric stranding. No evidence of renal calculi or hydronephrosis. Electronically signed by Iggy Asher MD on 02-20-23 at [id number]    XR CHEST PORTABLE    Result Date: 2/19/2023  CHEST, ONE VIEW: CLINICAL history: Shortness of air Findings : The study demonstrates poor inspiratory radiograph. There is atelectasis seen in the right lung base. The remaining lungs are clear. There is no evidence of pulmonary infiltrate or pleural effusion. The pulmonary vascularity is unremarkable. The cardiac, hilar and mediastinal silhouettes are satisfactory. The bony thorax demonstrates no gross abnormality. Atelectasis in right lung base NO EVIDENCE OF AIRSPACE CONSOLIDATION OR PULMONARY VENOUS CONGESTION. US EXTREMITY LEFT NON VASC LIMITED    Result Date: 2/20/2023  Left calf SONOGRAM: FINDINGS : The Study of left calf demonstrate there is no masses or abscess identified. .    No abnormality identified in the area of concern in the left calf region          Medication List        START taking these medications      polyethylene glycol 17 g packet  Commonly known as: GLYCOLAX  Take 17 g by mouth daily as needed for Constipation or Other (No BM in 60 hours)     predniSONE 20 MG tablet  Commonly known as: DELTASONE  Take 1 tablet by mouth daily for 3 days  Start taking on: February 24, 2023     tiZANidine 4 MG tablet  Commonly known as: ZANAFLEX  Take 1 tablet by mouth every 6 hours as needed (Lower back pain, muscle spasm)            CONTINUE taking these medications      amLODIPine 10 MG tablet  Commonly known as: NORVASC     aspirin 81 MG EC tablet     b complex vitamins capsule     cetirizine 10 MG tablet  Commonly known as: ZYRTEC     clopidogrel 75 MG tablet  Commonly known as: PLAVIX     doxazosin 8 MG tablet  Commonly known as: CARDURA     DULoxetine 20 MG extended release capsule  Commonly known as: CYMBALTA     empagliflozin 25 MG tablet  Commonly known as: JARDIANCE     ezetimibe 10 MG tablet  Commonly known as: ZETIA     finasteride 5 MG tablet  Commonly known as: PROSCAR     fish oil 1000 MG capsule     * gabapentin 300 MG capsule  Commonly known as: NEURONTIN     * gabapentin 100 MG capsule  Commonly known as: NEURONTIN     Garlic 7694 MG Caps     HYDROcodone-acetaminophen 7.5-325 MG per tablet  Commonly known as: NORCO     Hyzaar 100-25 MG per tablet  Generic drug: losartan-hydroCHLOROthiazide     insulin glargine 100 UNIT/ML injection vial  Commonly known as: LANTUS     omeprazole 20 MG delayed release capsule  Commonly known as: PRILOSEC     rOPINIRole 2 MG tablet  Commonly known as: REQUIP     Rosuvastatin Calcium 20 MG Cpsp     vitamin D 25 MCG (1000 UT) Caps           * This list has 2 medication(s) that are the same as other medications prescribed for you. Read the directions carefully, and ask your doctor or other care provider to review them with you. STOP taking these medications      amoxicillin 875 MG tablet  Commonly known as: AMOXIL               Where to Get Your Medications        These medications were sent to [facility], [address] [place] [address]  [address] , [address] [place] [address] [place] [address]      Phone: [phone]   polyethylene glycol 17 g packet  predniSONE 20 MG tablet  tiZANidine 4 MG tablet           ISSUES TO BE ADDRESSED AT HOSPITAL FOLLOW-UP VISIT:      Advised patient to follow-up closely with PCP upon discharge for management of chronic medical issues    Condition on Discharge: stable  Discharge Disposition: Home    Recommended Follow Up:  No follow-up provider specified. Followup Appointments Scheduled at Time of Discharge:  No future appointments. Discharge Instructions:   Please see the discharge paperwork. Patient was seen at bedside today, and the examination shows improvement since yesterday. Diet Instructions:  ADULT DIET; Regular; 4 carb choices (60 gm/meal);  Low Potassium (Less than 3000 mg/day) Detailed discharge directions delivered to the patient by myself and our nursing staff, who verbalizes understanding and is satisfied with the plan. Patient has been advised to continue all medications as prescribed and advised, and f/u with PCP within 1 week. Patient is stable from medical standpoint to be discharged. Total time spent during patient evaluation and assessment, discussion with the nurse/family, addressing discharge medications/scripts and coordination of care for safe discharge was in excess of 33 minutes.       Signed Electronically:    Jean Belle MD  9:48 AM 2/23/2023

## 2023-02-23 NOTE — PLAN OF CARE
Problem: Discharge Planning  Goal: Discharge to home or other facility with appropriate resources  2023 1145 by Argentina Carbajal RN  Outcome: Completed  2023 by Ed Ocampo LPN  Outcome: Sujata David (Taken 2023)  Discharge to home or other facility with appropriate resources: Identify barriers to discharge with patient and caregiver     Problem: Pain  Goal: Verbalizes/displays adequate comfort level or baseline comfort level  2023 1145 by Argentina Carbajal RN  Outcome: Completed  2023 by Ed Ocampo LPN  Outcome: Progressing     Problem: Safety - Adult  Goal: Free from fall injury  2023 1145 by Argentina Carbajal RN  Outcome: Completed  2023 by Ed Ocampo LPN  Outcome: Sujata David (Taken 2023 0156)  Free From Fall Injury: Instruct family/caregiver on patient safety     Problem: ABCDS Injury Assessment  Goal: Absence of physical injury  2023 1145 by Argentina Carbajal RN  Outcome: Completed  2023 by Ed Ocampo LPN  Outcome: Progressing  Flowsheets (Taken 2023 0156)  Absence of Physical Injury: Implement safety measures based on patient assessment     Problem: Chronic Conditions and Co-morbidities  Goal: Patient's chronic conditions and co-morbidity symptoms are monitored and maintained or improved  2023 1145 by Argentina Carbajal RN  Outcome: Completed  2023 by Ed Ocampo LPN  Outcome: Sujata Hernandez (Taken 2023 214)  Care Plan - Patient's Chronic Conditions and Co-Morbidity Symptoms are Monitored and Maintained or Improved: Monitor and assess patient's chronic conditions and comorbid symptoms for stability, deterioration, or improvement

## 2023-02-23 NOTE — PLAN OF CARE
Problem: Discharge Planning  Goal: Discharge to home or other facility with appropriate resources  Outcome: Progressing  Flowsheets (Taken 2/22/2023 2149)  Discharge to home or other facility with appropriate resources: Identify barriers to discharge with patient and caregiver     Problem: Pain  Goal: Verbalizes/displays adequate comfort level or baseline comfort level  Outcome: Progressing     Problem: Safety - Adult  Goal: Free from fall injury  Outcome: Progressing  Flowsheets (Taken 2/23/2023 0156)  Free From Fall Injury: Instruct family/caregiver on patient safety     Problem: ABCDS Injury Assessment  Goal: Absence of physical injury  Outcome: Progressing  Flowsheets (Taken 2/23/2023 0156)  Absence of Physical Injury: Implement safety measures based on patient assessment     Problem: Chronic Conditions and Co-morbidities  Goal: Patient's chronic conditions and co-morbidity symptoms are monitored and maintained or improved  Outcome: Progressing  Flowsheets (Taken 2/22/2023 2149)  Care Plan - Patient's Chronic Conditions and Co-Morbidity Symptoms are Monitored and Maintained or Improved: Monitor and assess patient's chronic conditions and comorbid symptoms for stability, deterioration, or improvement

## 2023-02-25 LAB
BLOOD CULTURE, ROUTINE: NORMAL
CULTURE, BLOOD 2: NORMAL

## 2023-03-16 ENCOUNTER — HOSPITAL ENCOUNTER (EMERGENCY)
Age: 73
Discharge: HOME | End: 2023-03-16
Payer: OTHER GOVERNMENT

## 2023-03-16 VITALS
WEIGHT: 250 LBS | RESPIRATION RATE: 16 BRPM | OXYGEN SATURATION: 98 % | DIASTOLIC BLOOD PRESSURE: 81 MMHG | BODY MASS INDEX: 32.1 KG/M2 | HEART RATE: 61 BPM | TEMPERATURE: 98 F | SYSTOLIC BLOOD PRESSURE: 135 MMHG

## 2023-03-16 DIAGNOSIS — J32.0 CHRONIC MAXILLARY SINUSITIS: ICD-10-CM

## 2023-03-16 DIAGNOSIS — U07.1 COVID-19: Primary | ICD-10-CM

## 2023-03-16 LAB — SARS-COV-2 RDRP RESP QL NAA+PROBE: DETECTED

## 2023-03-16 PROCEDURE — 99283 EMERGENCY DEPT VISIT LOW MDM: CPT

## 2023-03-16 PROCEDURE — 87635 SARS-COV-2 COVID-19 AMP PRB: CPT

## 2023-03-16 RX ORDER — NIRMATRELVIR AND RITONAVIR 150-100 MG
KIT ORAL
Qty: 20 TABLET | Refills: 0 | Status: SHIPPED | OUTPATIENT
Start: 2023-03-16 | End: 2023-03-21

## 2023-03-16 RX ORDER — PREDNISONE 10 MG/1
10 TABLET ORAL DAILY
Qty: 10 TABLET | Refills: 0 | Status: SHIPPED | OUTPATIENT
Start: 2023-03-16 | End: 2023-03-26

## 2023-03-16 RX ORDER — FLUTICASONE PROPIONATE 50 MCG
1 SPRAY, SUSPENSION (ML) NASAL DAILY
Qty: 32 G | Refills: 1 | Status: SHIPPED | OUTPATIENT
Start: 2023-03-16

## 2023-03-16 ASSESSMENT — ENCOUNTER SYMPTOMS
EYE ITCHING: 0
APNEA: 0
PHOTOPHOBIA: 0
SHORTNESS OF BREATH: 0
BACK PAIN: 0
COUGH: 0
COLOR CHANGE: 0
EYE DISCHARGE: 0

## 2023-03-16 ASSESSMENT — PAIN - FUNCTIONAL ASSESSMENT: PAIN_FUNCTIONAL_ASSESSMENT: NONE - DENIES PAIN

## 2023-03-16 NOTE — Clinical Note
Lisa Feldman was seen and treated in our emergency department on 3/16/2023. He may return to work on 03/21/2023. Recommended 5 day quarantine from exposure or positive test [facility]. Plan for paxlovid and can come out of quarantine after 5 days pending no development of respiratory symptoms. If you have any questions or concerns, please don't hesitate to call.       MELVIN Garcia

## 2023-03-17 NOTE — ED PROVIDER NOTES
[facility]  eMERGENCYdEPARTMENT eNCOUnter      Pt Name: Cher Spears  MRN: [id number]  [place] 1950  Date of evaluation: 3/16/2023  Provider:MELVIN Johnson    CHIEF COMPLAINT       Chief Complaint   Patient presents with    Covid Testing     Pt states he has 2 coworkers that tested positive and his home test was questionable. HISTORY OF PRESENT ILLNESS  (Location/Symptom, Timing/Onset, Context/Setting, Quality, Duration, Modifying Factors, Severity.)   Cher Spears is a 71 y.o. male who presents to the emergency department with complaints of congestion no resp complaints. He is asymptomatic otherwise he is traveling for [employer] works he is from [place] he states known positive exposure to covid from two coworkers Monday his swab was Bubbl" he came here for repeat swab. HPI    Nursing Notes were reviewed and I agree. REVIEW OF SYSTEMS    (2-9 systems for level 4, 10 or more for level 5)     Review of Systems   Constitutional:  Negative for activity change, appetite change, chills and fever. HENT:  Negative for congestion and dental problem. Eyes:  Negative for photophobia, discharge and itching. Respiratory:  Negative for apnea, cough and shortness of breath. Cardiovascular:  Negative for chest pain. Musculoskeletal:  Negative for arthralgias, back pain, gait problem, myalgias and neck pain. Skin:  Negative for color change, pallor and rash. Neurological:  Negative for dizziness, seizures and syncope. Psychiatric/Behavioral:  Negative for agitation. The patient is not nervous/anxious. Except as noted above the remainder of the review of systems was reviewed and negative.        PAST MEDICAL HISTORY     Past Medical History:   Diagnosis Date    CAD (coronary artery disease)     Class 1 obesity     Colon polyps     Diabetes mellitus ([facility]     Hyperlipidemia     Hypertension     Kidney stone     Psoriatic arthritis ([facility]     Stage 3b chronic kidney disease (CKD) ([facility] GFR 30 for YAHIR at [facility] on 12MLM12          SURGICAL HISTORY       Past Surgical History:   Procedure Laterality Date    BACK SURGERY  1993    CATARACT EXTRACTION W/ INTRAOCULAR LENS IMPLANT Bilateral 2000    DENTAL SURGERY  01/2023    lowe right wisdomn tooth extraction    FRACTURE SURGERY Right 2000    elbow    TUMOR REMOVAL  1995    fatty tumor, at base of skull         CURRENT MEDICATIONS       Previous Medications    AMLODIPINE (NORVASC) 10 MG TABLET    Take 10 mg by mouth daily    ASPIRIN 81 MG EC TABLET    Take 81 mg by mouth daily    B COMPLEX VITAMINS CAPSULE    Take 1 capsule by mouth daily    CETIRIZINE (ZYRTEC) 10 MG TABLET    Take 10 mg by mouth daily    CLOPIDOGREL (PLAVIX) 75 MG TABLET    Take 75 mg by mouth daily 1/2 tab    DOXAZOSIN (CARDURA) 8 MG TABLET    Take 4 mg by mouth nightly    DULOXETINE (CYMBALTA) 20 MG EXTENDED RELEASE CAPSULE    Take 20 mg by mouth daily    EMPAGLIFLOZIN (JARDIANCE) 25 MG TABLET    Take 25 mg by mouth daily    EZETIMIBE (ZETIA) 10 MG TABLET    Take 10 mg by mouth daily    FINASTERIDE (PROSCAR) 5 MG TABLET    Take 5 mg by mouth daily    GABAPENTIN (NEURONTIN) 100 MG CAPSULE    Take 100 mg by mouth 3 times daily. GABAPENTIN (NEURONTIN) 300 MG CAPSULE    Take 300 mg by mouth 3 times daily. GARLIC 6228 MG CAPS    Take by mouth    HYDROCODONE-ACETAMINOPHEN (NORCO) 7.5-325 MG PER TABLET    Take 1 tablet by mouth every 8 hours as needed for Pain.     INSULIN GLARGINE (LANTUS) 100 UNIT/ML INJECTION VIAL    Inject 35 Units into the skin 2 times daily    LOSARTAN-HYDROCHLOROTHIAZIDE (HYZAAR) 100-25 MG PER TABLET    Take 1 tablet by mouth daily    OMEGA-3 FATTY ACIDS (FISH OIL) 1000 MG CAPSULE    Take by mouth 2 times daily    OMEPRAZOLE (PRILOSEC) 20 MG DELAYED RELEASE CAPSULE    Take 20 mg by mouth daily    POLYETHYLENE GLYCOL (GLYCOLAX) 17 G PACKET    Take 17 g by mouth daily as needed for Constipation or Other (No BM in 60 hours)    ROPINIROLE (REQUIP) 2 MG TABLET    Take 2 mg by mouth in the morning and at bedtime 1/2 tab in am and 1 tab at night    ROSUVASTATIN CALCIUM 20 MG CPSP    Take 10 mg by mouth    TIZANIDINE (ZANAFLEX) 4 MG TABLET    Take 1 tablet by mouth every 6 hours as needed (Lower back pain, muscle spasm)    VITAMIN D 25 MCG (1000 UT) CAPS    1 capsule       ALLERGIES     Patient has no known allergies. FAMILY HISTORY       Family History   Problem Relation Age of Onset    Heart Disease Mother         did at age 80    Heart Surgery Mother         has CABG 2 times    Heart Attack Father          og this atage 80    Heart Disease Father     Heart Surgery Sister     Heart Disease Sister     Other Brother         COVID-19    Diabetes Brother         bilateral BKA    Other Son         born with half a liver    Other Son         has mild cystic fibrosis          SOCIAL HISTORY       Social History     Socioeconomic History    Marital status:      Spouse name: Mrs. 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 Aliyah Hardy, his wife, [phone]    AMBULATION: without difficulty    DOMICILED: no stair in home in [place], but has a flight of stairs from garage here in [place] or 5 stairs for the front porch, and 2 dogs       SCREENINGS    Michelle Coma Scale  Eye Opening: Spontaneous  Best Verbal Response: Oriented  Best Motor Response: Obeys commands  Michelle Coma Scale Score: 15      PHYSICAL EXAM    (up to 7 forlevel 4, 8 or more for level 5)     ED Triage Vitals   BP Temp Temp Source Heart Rate Resp SpO2 Height Weight   03/16/23 1855 03/16/23 1854 03/16/23 1854 03/16/23 1855 03/16/23 1854 03/16/23 1854 -- 03/16/23 1854   (!) 147/73 97.9 °F (36.6 °C) Temporal 58 18 94 %  250 lb (113.4 kg)       Physical Exam  Constitutional:       General: He is not in acute distress. Appearance: He is well-developed. He is not diaphoretic. HENT:      Head: Normocephalic and atraumatic. Right Ear: Tympanic membrane, ear canal and external ear normal.      Left Ear: Tympanic membrane, ear canal and external ear normal.      Nose: Congestion and rhinorrhea present. Mouth/Throat:      Mouth: Mucous membranes are moist.   Eyes:      Pupils: Pupils are equal, round, and reactive to light. Neck:      Trachea: No tracheal deviation. Cardiovascular:      Rate and Rhythm: Normal rate and regular rhythm. Pulses: Normal pulses. Heart sounds: Normal heart sounds. No murmur heard. Pulmonary:      Effort: Pulmonary effort is normal.      Breath sounds: Normal breath sounds. No stridor. No wheezing. Chest:      Chest wall: No tenderness. Abdominal:      General: Bowel sounds are normal. There is no distension. Palpations: Abdomen is soft. Tenderness: There is no abdominal tenderness. Musculoskeletal:         General: Normal range of motion. Cervical back: Normal range of motion and neck supple. Skin:     General: Skin is warm and dry. Capillary Refill: Capillary refill takes less than 2 seconds. Neurological:      Mental Status: He is alert and oriented to person, place, and time.    Psychiatric:         Behavior: Behavior normal.         DIAGNOSTIC RESULTS     RADIOLOGY:   Non-plain film images such as CT, Ultrasound and MRI are read by the radiologist. Plain radiographic images are visualized and preliminarilyinterpreted by No att. providers found with the below findings:        Interpretation per the Radiologist below, if available at the time of this note:    No orders to display       LABS:  Labs Reviewed   COVID-19, RAPID - Abnormal; Notable for the following components:       Result Value    SARS-CoV-2, NAAT DETECTED (*)     All other components within normal limits       All other labs were within normal range or notreturned as of this dictation. RE-ASSESSMENT          EMERGENCY DEPARTMENT COURSE and DIFFERENTIAL DIAGNOSIS/MDM:   Vitals:    Vitals:    03/16/23 1854 03/16/23 1855   BP:  (!) 147/73   Pulse:  58   Resp: 18    Temp: 97.9 °F (36.6 °C)    TempSrc: Temporal    SpO2: 94% 97%   Weight: 250 lb (113.4 kg)          MDM  Plan for quarantine 5 days based on [place] guidelines. He is meeting criteria for paxlovid. He understands symptoms to watch for as is asymptomatic now if they should develop plan on quarantine for 10 days. All questions were entertained and answered. PROCEDURES:    Procedures      FINAL IMPRESSION      1. COVID-19    2. Chronic maxillary sinusitis          DISPOSITION/PLAN   DISPOSITION Decision To Discharge 03/16/2023 07:35:57 PM      PATIENT REFERRED TO:  [facility] EMERGENCY DEPT  [facility]  [phone]    If symptoms worsen    DISCHARGE MEDICATIONS:  New Prescriptions    FLUTICASONE (FLONASE) 50 MCG/ACT NASAL SPRAY    1 spray by Each Nostril route daily    NIRMATRELVIR/RITONAVIR (PAXLOVID, 150/100,) 10 X 150 MG & 10 X 100MG TBPK    Take 2 tablets (one 150 mg nirmatrelvir and one 100 mg ritonavir tablets) by mouth every 12 hours for 5 days.     PREDNISONE (DELTASONE) 10 MG TABLET    Take 1 tablet by mouth daily for 10 days       (Please note that portions of this note were completed with a voice recognition program.  Efforts were made to edit the dictations but occasionallywords are mis-transcribed.)    MELVIN Gilliland, PA  03/16/23 1937